# Patient Record
Sex: MALE | Race: WHITE | Employment: OTHER
[De-identification: names, ages, dates, MRNs, and addresses within clinical notes are randomized per-mention and may not be internally consistent; named-entity substitution may affect disease eponyms.]

---

## 2021-01-15 ENCOUNTER — TRANSCRIBE ORDERS (OUTPATIENT)
Dept: OTHER | Age: 76
End: 2021-01-15

## 2021-01-15 DIAGNOSIS — N40.1 BENIGN PROSTATIC HYPERPLASIA WITH LOWER URINARY TRACT SYMPTOMS: Primary | ICD-10-CM

## 2021-01-19 NOTE — TELEPHONE ENCOUNTER
MEDICAL RECORDS REQUEST   [facility]  Urology Clinic  [address] [place] [address]  PHONE: [phone]  Fax: [phone]        FUTURE VISIT INFORMATION                                                   Carlos Ricks, : 1945 scheduled for future visit at [facility]    APPOINTMENT INFORMATION:    Date: 2021 7AM    Provider:  BELLE Page    Reason for Visit/Diagnosis: BPH     REFERRAL INFORMATION:    Referring provider:  N/A    Specialty: N/A    [facility]    Clinic contact number:  N/A    RECORDS REQUESTED FOR VISIT                                                     NOTES  STATUS/DETAILS   OFFICE NOTE from referring provider  yes   OFFICE NOTE from other specialist  no   DISCHARGE SUMMARY from hospital  no   DISCHARGE REPORT from the ER  no   OPERATIVE REPORT  no   MEDICATION LIST  no     PRE-VISIT CHECKLIST      Record collection complete Yes - [facility] rec[facility] under Media tab in epic    Appointment appropriately scheduled           (right time/right provider) Yes   MyChart activation If no, please explain: in process   Questionnaire complete If no, please explain: in process      Completed by: Sherri Brock

## 2021-01-29 ENCOUNTER — PRE VISIT (OUTPATIENT)
Dept: UROLOGY | Facility: CLINIC | Age: 76
End: 2021-01-29

## 2021-01-29 NOTE — TELEPHONE ENCOUNTER
Chief Complaint : [facility] - [facility]    Hx/Sx: Urinary retention, BPH    Records/Orders: [facility] being obtained    Pt Contacted: N/a    At Rooming: Bobby Roland EMT

## 2021-02-12 ENCOUNTER — PRE VISIT (OUTPATIENT)
Dept: UROLOGY | Facility: CLINIC | Age: 76
End: 2021-02-12

## 2021-02-12 ENCOUNTER — VIRTUAL VISIT (OUTPATIENT)
Dept: UROLOGY | Facility: CLINIC | Age: 76
End: 2021-02-12
Payer: COMMERCIAL

## 2021-02-12 VITALS — BODY MASS INDEX: 25.2 KG/M2 | HEIGHT: 71 IN | WEIGHT: 180 LBS

## 2021-02-12 DIAGNOSIS — N40.1 BENIGN PROSTATIC HYPERPLASIA WITH URINARY RETENTION: Primary | ICD-10-CM

## 2021-02-12 DIAGNOSIS — R33.8 BENIGN PROSTATIC HYPERPLASIA WITH URINARY RETENTION: Primary | ICD-10-CM

## 2021-02-12 PROCEDURE — 99203 OFFICE O/P NEW LOW 30 MIN: CPT | Mod: 95 | Performed by: NURSE PRACTITIONER

## 2021-02-12 RX ORDER — ASPIRIN 81 MG/1
81 TABLET ORAL DAILY
COMMUNITY

## 2021-02-12 RX ORDER — TAMSULOSIN HYDROCHLORIDE 0.4 MG/1
0.4 CAPSULE ORAL EVERY EVENING
Status: ON HOLD | COMMUNITY
Start: 2020-07-22 | End: 2021-06-03

## 2021-02-12 ASSESSMENT — PAIN SCALES - GENERAL: PAINLEVEL: NO PAIN (0)

## 2021-02-12 ASSESSMENT — MIFFLIN-ST. JEOR: SCORE: 1583.6

## 2021-02-12 NOTE — LETTER
2/12/2021       RE: Carlos Ricks  [address]  [place] [address]     Dear Colleague,    Thank you for referring your patient, Carlos Ricks, to the [facility] UROLOGY CLINIC [place] at [facility]. Please see a copy of my visit note below.    Carlos is a 75 year old who is being evaluated via a billable telephone visit.     How would you like to obtain your AVS? Mail a copy  If the video visit is dropped, the invitation should be resent by: Send to e-mail at: [email or web address] - [email or web address]  Will anyone else be joining your video visit? No        Carlos Ricks complains of   Chief Complaint   Patient presents with     Consult For     BPH, not currently experiencing LUTS, CIC BID         Assessment/Plan:  75 year old male with a history of BPH with urinary retention and large bladder diverticula x2. Is interested in pursuing the HoLEP procedure, and we discussed the potential surgical risks today, including the following:   -Bleeding, potentially significant enough to require clot evacuation and blood transfusion  -Infection, for which we will plan to treat preoperatively based on targeted antibiotic therapy  -Damage to the bladder, urethra and penis including the risk of urethral stricture and bladder neck contracture  -Risk of incidentally discovered prostate cancer.  We discussed that this would not preclude him from further therapy though it could prolong recovery and potentially increase risk of complications associated with cancer treatment.   -Risk of retrograde ejaculation which would be expected to occur in the majority if not all men after HoLEP  -Risk of urinary incontinence.  We discussed that in the majority of men this is a transient process that is generally self limited to the first 6-12 weeks after surgery though could take longer to resolve depending on baseline bladder instability.  -Risk of postperative urinary retention though in published  series HoLEP has been found to be associated with high success rates of achieving spontaneous voiding even in men with underactive and atonic bladders.    Patient to follow up with Dr. Carbajal for a cystoscopy.      Olamide Page, CNP  Department of Urology      Subjective:     75 year old male with a history of BPH with urinary retention and large bladder diverticula x2 who [facility].     He is currently performing CIC twice daily, AM and PM, with no issue. Takes Flomax as well, which seems to help his symptoms; notices they worsen when he skips doses. He feels he is stable on his current regimen of Flomax and CIC but does wish to pursue surgery to avoid permanent catheter use.         Duration of telephone call: 22 minutes

## 2021-02-12 NOTE — PROGRESS NOTES
Carlos is a 75 year old who is being evaluated via a billable telephone visit.     How would you like to obtain your AVS? Mail a copy  If the video visit is dropped, the invitation should be resent by: Send to e-mail at: [email or web address]  Will anyone else be joining your video visit? No        Carlos Ricks complains of   Chief Complaint   Patient presents with     Consult For     BPH, not currently experiencing LUTS, CIC BID         Assessment/Plan:  75 year old male with a history of BPH with urinary retention and large bladder diverticula x2. Is interested in pursuing the HoLEP procedure, and we discussed the potential surgical risks today, including the following:   -Bleeding, potentially significant enough to require clot evacuation and blood transfusion  -Infection, for which we will plan to treat preoperatively based on targeted antibiotic therapy  -Damage to the bladder, urethra and penis including the risk of urethral stricture and bladder neck contracture  -Risk of incidentally discovered prostate cancer.  We discussed that this would not preclude him from further therapy though it could prolong recovery and potentially increase risk of complications associated with cancer treatment.   -Risk of retrograde ejaculation which would be expected to occur in the majority if not all men after HoLEP  -Risk of urinary incontinence.  We discussed that in the majority of men this is a transient process that is generally self limited to the first 6-12 weeks after surgery though could take longer to resolve depending on baseline bladder instability.  -Risk of postperative urinary retention though in published series HoLEP has been found to be associated with high success rates of achieving spontaneous voiding even in men with underactive and atonic bladders.    Patient to follow up with Dr. Carbajal for a cystoscopy.      Olamide Page, CNP  Department of Urology      Subjective:     75 year old male with a history  of BPH with urinary retention and large bladder diverticula x2 who [facility].     He is currently performing CIC twice daily, AM and PM, with no issue. Takes Flomax as well, which seems to help his symptoms; notices they worsen when he skips doses. He feels he is stable on his current regimen of Flomax and CIC but does wish to pursue surgery to avoid permanent catheter use.         Duration of telephone call: 22 minutes

## 2021-02-12 NOTE — NURSING NOTE
Chief Complaint   Patient presents with     Consult For     BPH, not currently experiencing LUTS, CIC BID   - Patt Louis,   EMT Clinic Support

## 2021-02-15 ENCOUNTER — TELEPHONE (OUTPATIENT)
Dept: UROLOGY | Facility: CLINIC | Age: 76
End: 2021-02-15

## 2021-03-13 ENCOUNTER — HEALTH MAINTENANCE LETTER (OUTPATIENT)
Age: 76
End: 2021-03-13

## 2021-04-08 ENCOUNTER — PRE VISIT (OUTPATIENT)
Dept: UROLOGY | Facility: CLINIC | Age: 76
End: 2021-04-08

## 2021-04-08 NOTE — TELEPHONE ENCOUNTER
Reason for visit: cysoscopy     Dx/Hx/Sx: BPH, CIC BID interested in HoLEP,     Records/imaging/labs/orders: availble    Pt called: NA    At Rooming: confirm MyChart AUA, urine sample, possible flow/PVR after cysto

## 2021-04-13 ENCOUNTER — OFFICE VISIT (OUTPATIENT)
Dept: UROLOGY | Facility: CLINIC | Age: 76
End: 2021-04-13
Payer: COMMERCIAL

## 2021-04-13 ENCOUNTER — TELEPHONE (OUTPATIENT)
Dept: UROLOGY | Facility: CLINIC | Age: 76
End: 2021-04-13

## 2021-04-13 VITALS
HEART RATE: 74 BPM | BODY MASS INDEX: 25.2 KG/M2 | WEIGHT: 180 LBS | DIASTOLIC BLOOD PRESSURE: 80 MMHG | HEIGHT: 71 IN | SYSTOLIC BLOOD PRESSURE: 120 MMHG

## 2021-04-13 DIAGNOSIS — R33.8 BENIGN PROSTATIC HYPERPLASIA WITH URINARY RETENTION: Primary | ICD-10-CM

## 2021-04-13 DIAGNOSIS — N40.1 BENIGN PROSTATIC HYPERPLASIA WITH URINARY RETENTION: Primary | ICD-10-CM

## 2021-04-13 PROCEDURE — 99207 PR NO CHARGE LOS: CPT | Performed by: UROLOGY

## 2021-04-13 PROCEDURE — 99207 PR NO CHARGE LOS: CPT

## 2021-04-13 RX ORDER — LIDOCAINE HYDROCHLORIDE 20 MG/ML
JELLY TOPICAL ONCE
Status: DISCONTINUED | OUTPATIENT
Start: 2021-04-13 | End: 2021-06-02

## 2021-04-13 ASSESSMENT — MIFFLIN-ST. JEOR: SCORE: 1583.6

## 2021-04-13 ASSESSMENT — PAIN SCALES - GENERAL: PAINLEVEL: NO PAIN (0)

## 2021-04-13 NOTE — NURSING NOTE
Pre Op Teaching Flowsheet       Pre and Post op Patient Education  Relevant Diagnosis:  Benign prostatic hyperplasia with urinary retention  Surgical procedure:  Holmium Laser Enucleation of the Prostate  Person Involved in teaching: Carlos Ricks      Patient demonstrates understanding of the following:  Date of surgery:  5/27/21  Location of surgery:  [facility]rd [facility]  History and Physical and any other testing necessary prior to surgery: Yes- Primary  Required time line for completion of History and Physical and any pre-op testing: Yes      Patient demonstrates understanding of the following:  Pre-op bowel prep:  None  Pre-op showering/scrub information with PCMX Soap: Yes  Blood thinner medications discussed and when to stop (if applicable):  N/A  Diabetic Management teaching:  N/A      Infection Prevention:   Patient demonstrates understanding of the following:  Surgical procedure site care taught: at time of discharge  Signs and symptoms of infection taught:  Yes      Post-op follow-up:  Discussed how to contact the hospital, nurse, and clinic scheduling staff if necessary.    Instructional materials used/given/mailed:  [facility] Booklet, Soap..    Surgical instructions packet given to patient in office:  Yes.    PCP:  Suyapa Gerber

## 2021-04-13 NOTE — NURSING NOTE
"Chief Complaint   Patient presents with     Cystoscopy       Blood pressure 120/80, pulse 74, height 1.803 m (5' 11\"), weight 81.6 kg (180 lb). Body mass index is 25.1 kg/m .    There is no problem list on file for this patient.      Allergies   Allergen Reactions     Seasonal Allergies Other (See Comments)     Other reaction(s): Rhinitis, Nasal discharge, Itching of eye       Current Outpatient Medications   Medication Sig Dispense Refill     aspirin 81 MG EC tablet Take 81 mg by mouth daily       Multiple Vitamin (MULTI-VITAMIN DAILY PO)        omeprazole (PRILOSEC) 20 MG DR capsule TAKE TWO CAPSULES BY MOUTH EVERY DAY ON AN EMPTY STOMACH, AT LEAST 30 MINUTES PRIOR TO A MEAL FOR GERD       tamsulosin (FLOMAX) 0.4 MG capsule          Social History     Tobacco Use     Smoking status: Former Smoker     Quit date: 1990     Years since quittin.1     Smokeless tobacco: Never Used   Substance Use Topics     Alcohol use: None     Drug use: None       Invasive Procedure Safety Checklist:    Procedure: Cystoscopy    Action: Complete sections and checkboxes as appropriate.    Pre-procedure:  1. Patient ID Verified with 2 identifiers (Kelly and  or MRN) : YES    2. Procedure and site verified with patient/designee (when able) : YES    3. Accurate consent documentation in medical record : YES    4. H&P (or appropriate assessment) documented in medical record : N/A  H&P must be up to 30 days prior to procedure an updated within 24 hours of                 Procedure as applicable.     5. Relevant diagnostic and radiology test results appropriately labeled and displayed as applicable : YES    6. Blood products, implants, devices, and/or special equipment available for the procedure as applicable : YES    7. Procedure site(s) marked with provider initials [Exclusions: none] : NO    8. Marking not required. Reason : Yes  Procedure does not require site marking    Time Out:     Time-Out performed immediately prior to " starting procedure, including verbal and active participation of all team members addressing: YES    1. Correct patient identity.  2. Confirmed that the correct side and site are marked.  3. An accurate procedure to be done.  4. Agreement on the procedure to be done.  5. Correct patient position.  6. Relevant images and results are properly labeled and appropriately displayed.  7. The need to administer antibiotics or fluids for irrigation purposes during the procedure as applicable.  8. Safety precautions based on patient history or medication use.    During Procedure: Verification of correct person, site, and procedure occurs any time the responsibility for care of the patient is transferred to another member of the care team.    The following medication was given:     MEDICATION:  Lidocaine without epinephrine 2% jelly  ROUTE: urethral   SITE: urethral   DOSE: 10 mL  LOT #: [id number]  : [employer]  EXPIRATION DATE: 10/22  NDC#: [address]-8   Was there drug waste? No    Prior to med admin, verified patient identity using patient's name and date of birth.  Due to med administration, patient instructed to remain in clinic for 15 minutes  afterwards, and to report any adverse reaction to me immediately.    Drug Amount Wasted:  None.  Vial/Syringe: Syringe      Vnekat Price  4/13/2021  2:11 PM

## 2021-04-13 NOTE — LETTER
4/13/2021       RE: Carlos Ricks  [address]  [place] [address]     Dear Colleague,    Thank you for referring your patient, Carlos Ricks, to the [facility] UROLOGY CLINIC [place] at [facility]. Please see a copy of my visit note below.    CYSTOSCOPY PROCEDURE NOTE    Reason for cystoscopy: Urinary retention    Brief History: 75-year-old gentleman with a history of urinary retention and large bladder diverticulum.  He has been previously counseled regarding treatment options and has been suggested to consider holmium laser enucleation of the prostate.  He has been on CIC though this is significantly bothersome to him.  He is emptying though incompletely especially since starting catheterization.    CYSTOSCOPY  After obtaining informed consent, the patient was prepped and draped in the standard sterile fashion.  The 15 Maldivian flexible cystoscope was inserted through the urethral meatus.      The anterior urethra was:  normal without stricture.    The external sphincter was  appropriately coapted.   The prostatic urethra demonstrated trilobar hypertrophy.    The bladder neck was  occlusive.    The bladder was notable for abundant debris.  We evacuated several hundred cc of cloudy appearing urine.  The remaining anterior of the bladder was surveilled to the best of our ability.  Visualization was somewhat poor.  We did eventually identify a very large right-sided bladder diverticulum.The ureteral orifices  were identified on each side in orthotopic position with efflux of clear urine.   There were severe trabeculations.    On retroflexion there was the usual bladder neck hyperemia.    There  intravesical protrusion of the prostate.      The patient tolerated the procedure well without complication.        Assessment/Plan:  After discussion of medical and surgical treatments for BPH including alpha blockers, anticholinergics, transurethral resection,  laser ablation, enucleation and simple prostatectomy, Mr. Webb decided to proceed with Holmium Laser Enucleation of the Prostate (HoLEP).    We discussed the associated risks of this procedure included but not limited to the following:  -Bleeding, potentially significant enough to require clot evacuation and blood transfusion  -Infection, for which we will plan to treat preoperatively based on targeted antibiotic therapy  -Damage to the bladder, urethra and penis including the risk of urethral stricture and bladder neck contracture  -Risk of incidentally discovered prostate cancer.  We discussed that this would not preclude him from further therapy though it could prolong recovery and potentially increase risk of complications associated with cancer treatment.  Further preoperative workup to assess for prostate cancer prior to surgery was offered but patient deferred.  -Risk of retrograde ejaculation which would be expected to occur in the majority if not all men after HoLEP  -Risk of urinary incontinence.  We discussed that in the majority of men this is a transient process that is generally self limited to the first 6-12 weeks after surgery though could take longer to resolve depending on baseline bladder instability.  -Risk of postperative urinary retention though in published series HoLEP has been found to be associated with high success rates of achieving spontaneous voiding even in men with underactive and atonic bladders.  -We will proceed with preoperative clearance with preference to minimize all anticoagulation as deemed acceptable by his primary care provider.   -In addition to the above we also discussed the nature of bladder diverticulum and that especially with large diverticuli there are times where a second surgery to remove the diverticulum is necessary.  We did review that in prior published series urinary retention even with a bladder diverticulum is typically able to be managed after  enucleation.        I, Florentin Carbajal saw and evaluated this patient and agree with the plan as stated above.  I personally performed all listed procedures.

## 2021-04-13 NOTE — TELEPHONE ENCOUNTER
Patient is scheduled for surgery with Dr. Carbajal    Spoke with: Tia RN - RN scheduled with patient in clinic    Date of Surgery: 5/27/2021    Location: [place]    Informed patient they will need an adult  yes    Pre-op with surgeon (if applicable): n/a    H&P: Scheduled with pcp    Pre-procedure COVID-19 Test: [facility] on 5/24/2021    Additional imaging/appointments: n/a    Surgery packet: Given in clinic     Additional comments: n/a

## 2021-04-26 NOTE — PROGRESS NOTES
CYSTOSCOPY PROCEDURE NOTE    Reason for cystoscopy: Urinary retention    Brief History: 75-year-old gentleman with a history of urinary retention and large bladder diverticulum.  He has been previously counseled regarding treatment options and has been suggested to consider holmium laser enucleation of the prostate.  He has been on CIC though this is significantly bothersome to him.  He is emptying though incompletely especially since starting catheterization.    CYSTOSCOPY  After obtaining informed consent, the patient was prepped and draped in the standard sterile fashion.  The 15 Occitan flexible cystoscope was inserted through the urethral meatus.      The anterior urethra was:  normal without stricture.    The external sphincter was  appropriately coapted.   The prostatic urethra demonstrated trilobar hypertrophy.    The bladder neck was  occlusive.    The bladder was notable for abundant debris.  We evacuated several hundred cc of cloudy appearing urine.  The remaining anterior of the bladder was surveilled to the best of our ability.  Visualization was somewhat poor.  We did eventually identify a very large right-sided bladder diverticulum.The ureteral orifices  were identified on each side in orthotopic position with efflux of clear urine.   There were severe trabeculations.    On retroflexion there was the usual bladder neck hyperemia.    There  intravesical protrusion of the prostate.      The patient tolerated the procedure well without complication.        Assessment/Plan:  After discussion of medical and surgical treatments for BPH including alpha blockers, anticholinergics, transurethral resection, laser ablation, enucleation and simple prostatectomy, Mr. Webb decided to proceed with Holmium Laser Enucleation of the Prostate (HoLEP).    We discussed the associated risks of this procedure included but not limited to the following:  -Bleeding, potentially significant enough to require clot evacuation  and blood transfusion  -Infection, for which we will plan to treat preoperatively based on targeted antibiotic therapy  -Damage to the bladder, urethra and penis including the risk of urethral stricture and bladder neck contracture  -Risk of incidentally discovered prostate cancer.  We discussed that this would not preclude him from further therapy though it could prolong recovery and potentially increase risk of complications associated with cancer treatment.  Further preoperative workup to assess for prostate cancer prior to surgery was offered but patient deferred.  -Risk of retrograde ejaculation which would be expected to occur in the majority if not all men after HoLEP  -Risk of urinary incontinence.  We discussed that in the majority of men this is a transient process that is generally self limited to the first 6-12 weeks after surgery though could take longer to resolve depending on baseline bladder instability.  -Risk of postperative urinary retention though in published series HoLEP has been found to be associated with high success rates of achieving spontaneous voiding even in men with underactive and atonic bladders.  -We will proceed with preoperative clearance with preference to minimize all anticoagulation as deemed acceptable by his primary care provider.   -In addition to the above we also discussed the nature of bladder diverticulum and that especially with large diverticuli there are times where a second surgery to remove the diverticulum is necessary.  We did review that in prior published series urinary retention even with a bladder diverticulum is typically able to be managed after enucleation.        IFlorentin saw and evaluated this patient and agree with the plan as stated above.  I personally performed all listed procedures.

## 2021-04-27 ENCOUNTER — TRANSFERRED RECORDS (OUTPATIENT)
Dept: HEALTH INFORMATION MANAGEMENT | Facility: CLINIC | Age: 76
End: 2021-04-27

## 2021-05-10 NOTE — TELEPHONE ENCOUNTER
Called and left a voicemail for patient informing him the Dr. Carbajal is no longer operating on 5/27/2021 and that we need to reschedule is surgery. Offered 6/2/2021 as new surgery date. Gave [phone] as call back number.

## 2021-05-10 NOTE — TELEPHONE ENCOUNTER
Returned patient's voicemail about 6/2/2021 working for the reschedule date for his surgery. Left surgery information on the voicemail.    Patient is scheduled for surgery with Dr. Ravne Esquivel voicemail for Carlos    Date of Surgery: 6/2/2021    Location: [place]    Informed patient they will need an adult  yes    Pre-op with surgeon (if applicable): n/a    H&P: Scheduled with pcp    Pre-procedure COVID-19 Test: [facility] on 5/29/2021    Additional imaging/appointments: n/a    Surgery packet: Mailed via [employer] on 5/10/21     Additional comments: n/a

## 2021-05-12 ENCOUNTER — PATIENT OUTREACH (OUTPATIENT)
Dept: UROLOGY | Facility: CLINIC | Age: 76
End: 2021-05-12

## 2021-05-12 DIAGNOSIS — R30.0 DYSURIA: Primary | ICD-10-CM

## 2021-05-13 DIAGNOSIS — R30.0 DYSURIA: ICD-10-CM

## 2021-05-13 LAB

## 2021-05-13 PROCEDURE — 87088 URINE BACTERIA CULTURE: CPT | Performed by: UROLOGY

## 2021-05-13 PROCEDURE — 87086 URINE CULTURE/COLONY COUNT: CPT | Performed by: UROLOGY

## 2021-05-13 PROCEDURE — 87186 SC STD MICRODIL/AGAR DIL: CPT | Performed by: UROLOGY

## 2021-05-13 PROCEDURE — 81001 URINALYSIS AUTO W/SCOPE: CPT | Performed by: UROLOGY

## 2021-05-16 LAB
BACTERIA SPEC CULT: ABNORMAL
BACTERIA SPEC CULT: ABNORMAL
Lab: ABNORMAL
SPECIMEN SOURCE: ABNORMAL

## 2021-05-17 ENCOUNTER — PATIENT OUTREACH (OUTPATIENT)
Dept: UROLOGY | Facility: CLINIC | Age: 76
End: 2021-05-17

## 2021-05-17 DIAGNOSIS — R30.0 DYSURIA: Primary | ICD-10-CM

## 2021-05-17 DIAGNOSIS — Z11.59 ENCOUNTER FOR SCREENING FOR OTHER VIRAL DISEASES: ICD-10-CM

## 2021-05-17 RX ORDER — NITROFURANTOIN 25; 75 MG/1; MG/1
100 CAPSULE ORAL 2 TIMES DAILY
Qty: 14 CAPSULE | Refills: 0 | Status: SHIPPED | OUTPATIENT
Start: 2021-05-17 | End: 2021-05-24

## 2021-05-18 ENCOUNTER — PATIENT OUTREACH (OUTPATIENT)
Dept: UROLOGY | Facility: CLINIC | Age: 76
End: 2021-05-18

## 2021-05-18 DIAGNOSIS — R30.0 DYSURIA: Primary | ICD-10-CM

## 2021-05-18 RX ORDER — NITROFURANTOIN 25; 75 MG/1; MG/1
100 CAPSULE ORAL 2 TIMES DAILY
Qty: 14 CAPSULE | Refills: 0 | Status: CANCELLED | OUTPATIENT
Start: 2021-05-18 | End: 2021-05-25

## 2021-05-18 RX ORDER — NITROFURANTOIN 25; 75 MG/1; MG/1
100 CAPSULE ORAL 2 TIMES DAILY
Qty: 14 CAPSULE | Refills: 0 | Status: ON HOLD | OUTPATIENT
Start: 2021-05-26 | End: 2021-06-03

## 2021-05-29 DIAGNOSIS — Z11.59 ENCOUNTER FOR SCREENING FOR OTHER VIRAL DISEASES: ICD-10-CM

## 2021-05-29 LAB
LABORATORY COMMENT REPORT: NORMAL
SARS-COV-2 RNA RESP QL NAA+PROBE: NEGATIVE
SARS-COV-2 RNA RESP QL NAA+PROBE: NORMAL
SPECIMEN SOURCE: NORMAL
SPECIMEN SOURCE: NORMAL

## 2021-05-29 PROCEDURE — U0005 INFEC AGEN DETEC AMPLI PROBE: HCPCS | Performed by: UROLOGY

## 2021-05-29 PROCEDURE — U0003 INFECTIOUS AGENT DETECTION BY NUCLEIC ACID (DNA OR RNA); SEVERE ACUTE RESPIRATORY SYNDROME CORONAVIRUS 2 (SARS-COV-2) (CORONAVIRUS DISEASE [COVID-19]), AMPLIFIED PROBE TECHNIQUE, MAKING USE OF HIGH THROUGHPUT TECHNOLOGIES AS DESCRIBED BY CMS-2020-01-R: HCPCS | Performed by: UROLOGY

## 2021-06-01 ENCOUNTER — ANESTHESIA EVENT (OUTPATIENT)
Dept: SURGERY | Facility: CLINIC | Age: 76
End: 2021-06-01
Payer: COMMERCIAL

## 2021-06-01 ENCOUNTER — PATIENT OUTREACH (OUTPATIENT)
Dept: UROLOGY | Facility: CLINIC | Age: 76
End: 2021-06-01

## 2021-06-01 RX ORDER — GLIPIZIDE 5 MG/1
5 TABLET ORAL EVERY MORNING
COMMUNITY

## 2021-06-01 NOTE — TELEPHONE ENCOUNTER
I called patient to confirm that Dr. Carbajal will update his pre-op for surgery tomorrow. Patient was relieved. Rajwinder Yates RN

## 2021-06-02 ENCOUNTER — ANESTHESIA (OUTPATIENT)
Dept: SURGERY | Facility: CLINIC | Age: 76
End: 2021-06-02
Payer: COMMERCIAL

## 2021-06-02 ENCOUNTER — HOSPITAL ENCOUNTER (OUTPATIENT)
Facility: CLINIC | Age: 76
Discharge: HOME | End: 2021-06-03
Attending: UROLOGY | Admitting: UROLOGY
Payer: COMMERCIAL

## 2021-06-02 DIAGNOSIS — R30.0 DYSURIA: ICD-10-CM

## 2021-06-02 DIAGNOSIS — N40.1 BENIGN PROSTATIC HYPERPLASIA WITH URINARY RETENTION: ICD-10-CM

## 2021-06-02 DIAGNOSIS — R33.8 BENIGN PROSTATIC HYPERPLASIA WITH URINARY RETENTION: ICD-10-CM

## 2021-06-02 LAB
ABO + RH BLD: NORMAL
ABO + RH BLD: NORMAL
BLD GP AB SCN SERPL QL: NORMAL
BLOOD BANK CMNT PATIENT-IMP: NORMAL
GLUCOSE BLDC GLUCOMTR-MCNC: 103 MG/DL (ref 70–99)
GLUCOSE BLDC GLUCOMTR-MCNC: 112 MG/DL (ref 70–99)
SPECIMEN EXP DATE BLD: NORMAL

## 2021-06-02 PROCEDURE — 88305 TISSUE EXAM BY PATHOLOGIST: CPT | Mod: TC | Performed by: UROLOGY

## 2021-06-02 PROCEDURE — 250N000011 HC RX IP 250 OP 636: Performed by: STUDENT IN AN ORGANIZED HEALTH CARE EDUCATION/TRAINING PROGRAM

## 2021-06-02 PROCEDURE — C1758 CATHETER, URETERAL: HCPCS | Performed by: UROLOGY

## 2021-06-02 PROCEDURE — 370N000017 HC ANESTHESIA TECHNICAL FEE, PER MIN: Performed by: UROLOGY

## 2021-06-02 PROCEDURE — 250N000011 HC RX IP 250 OP 636: Performed by: NURSE ANESTHETIST, CERTIFIED REGISTERED

## 2021-06-02 PROCEDURE — 710N000010 HC RECOVERY PHASE 1, LEVEL 2, PER MIN: Performed by: UROLOGY

## 2021-06-02 PROCEDURE — 250N000011 HC RX IP 250 OP 636: Performed by: UROLOGY

## 2021-06-02 PROCEDURE — 258N000003 HC RX IP 258 OP 636: Performed by: NURSE ANESTHETIST, CERTIFIED REGISTERED

## 2021-06-02 PROCEDURE — 999N000141 HC STATISTIC PRE-PROCEDURE NURSING ASSESSMENT: Performed by: UROLOGY

## 2021-06-02 PROCEDURE — 88305 TISSUE EXAM BY PATHOLOGIST: CPT | Mod: 26 | Performed by: PATHOLOGY

## 2021-06-02 PROCEDURE — 36415 COLL VENOUS BLD VENIPUNCTURE: CPT | Performed by: UROLOGY

## 2021-06-02 PROCEDURE — 360N000077 HC SURGERY LEVEL 4, PER MIN: Performed by: UROLOGY

## 2021-06-02 PROCEDURE — 86900 BLOOD TYPING SEROLOGIC ABO: CPT | Performed by: UROLOGY

## 2021-06-02 PROCEDURE — 250N000013 HC RX MED GY IP 250 OP 250 PS 637: Performed by: STUDENT IN AN ORGANIZED HEALTH CARE EDUCATION/TRAINING PROGRAM

## 2021-06-02 PROCEDURE — 272N000001 HC OR GENERAL SUPPLY STERILE: Performed by: UROLOGY

## 2021-06-02 PROCEDURE — 258N000003 HC RX IP 258 OP 636

## 2021-06-02 PROCEDURE — 86901 BLOOD TYPING SEROLOGIC RH(D): CPT | Performed by: UROLOGY

## 2021-06-02 PROCEDURE — 250N000009 HC RX 250: Performed by: NURSE ANESTHETIST, CERTIFIED REGISTERED

## 2021-06-02 PROCEDURE — 82962 GLUCOSE BLOOD TEST: CPT

## 2021-06-02 PROCEDURE — 258N000001 HC RX 258: Performed by: STUDENT IN AN ORGANIZED HEALTH CARE EDUCATION/TRAINING PROGRAM

## 2021-06-02 PROCEDURE — 258N000003 HC RX IP 258 OP 636: Performed by: UROLOGY

## 2021-06-02 PROCEDURE — 250N000025 HC SEVOFLURANE, PER MIN: Performed by: UROLOGY

## 2021-06-02 PROCEDURE — 86850 RBC ANTIBODY SCREEN: CPT | Performed by: UROLOGY

## 2021-06-02 RX ORDER — NALOXONE HYDROCHLORIDE 0.4 MG/ML
0.4 INJECTION, SOLUTION INTRAMUSCULAR; INTRAVENOUS; SUBCUTANEOUS
Status: DISCONTINUED | OUTPATIENT
Start: 2021-06-02 | End: 2021-06-03 | Stop reason: HOSPADM

## 2021-06-02 RX ORDER — ONDANSETRON 4 MG/1
4 TABLET, ORALLY DISINTEGRATING ORAL EVERY 30 MIN PRN
Status: DISCONTINUED | OUTPATIENT
Start: 2021-06-02 | End: 2021-06-02 | Stop reason: HOSPADM

## 2021-06-02 RX ORDER — FENTANYL CITRATE 50 UG/ML
25-50 INJECTION, SOLUTION INTRAMUSCULAR; INTRAVENOUS
Status: DISCONTINUED | OUTPATIENT
Start: 2021-06-02 | End: 2021-06-02 | Stop reason: CLARIF

## 2021-06-02 RX ORDER — SODIUM CHLORIDE, SODIUM LACTATE, POTASSIUM CHLORIDE, CALCIUM CHLORIDE 600; 310; 30; 20 MG/100ML; MG/100ML; MG/100ML; MG/100ML
INJECTION, SOLUTION INTRAVENOUS CONTINUOUS
Status: CANCELLED | OUTPATIENT
Start: 2021-06-02

## 2021-06-02 RX ORDER — PROPOFOL 10 MG/ML
INJECTION, EMULSION INTRAVENOUS PRN
Status: DISCONTINUED | OUTPATIENT
Start: 2021-06-02 | End: 2021-06-02

## 2021-06-02 RX ORDER — SODIUM CHLORIDE, SODIUM LACTATE, POTASSIUM CHLORIDE, CALCIUM CHLORIDE 600; 310; 30; 20 MG/100ML; MG/100ML; MG/100ML; MG/100ML
INJECTION, SOLUTION INTRAVENOUS CONTINUOUS PRN
Status: DISCONTINUED | OUTPATIENT
Start: 2021-06-02 | End: 2021-06-02

## 2021-06-02 RX ORDER — EPHEDRINE SULFATE 50 MG/ML
INJECTION, SOLUTION INTRAMUSCULAR; INTRAVENOUS; SUBCUTANEOUS PRN
Status: DISCONTINUED | OUTPATIENT
Start: 2021-06-02 | End: 2021-06-02

## 2021-06-02 RX ORDER — SODIUM CHLORIDE 9 MG/ML
INJECTION, SOLUTION INTRAVENOUS
Status: COMPLETED
Start: 2021-06-02 | End: 2021-06-02

## 2021-06-02 RX ORDER — LIDOCAINE HYDROCHLORIDE 20 MG/ML
INJECTION, SOLUTION INFILTRATION; PERINEURAL PRN
Status: DISCONTINUED | OUTPATIENT
Start: 2021-06-02 | End: 2021-06-02

## 2021-06-02 RX ORDER — LIDOCAINE 40 MG/G
CREAM TOPICAL
Status: DISCONTINUED | OUTPATIENT
Start: 2021-06-02 | End: 2021-06-03 | Stop reason: HOSPADM

## 2021-06-02 RX ORDER — OXYCODONE HYDROCHLORIDE 5 MG/1
5-10 TABLET ORAL
Status: DISCONTINUED | OUTPATIENT
Start: 2021-06-02 | End: 2021-06-03 | Stop reason: HOSPADM

## 2021-06-02 RX ORDER — NALOXONE HYDROCHLORIDE 0.4 MG/ML
0.4 INJECTION, SOLUTION INTRAMUSCULAR; INTRAVENOUS; SUBCUTANEOUS
Status: DISCONTINUED | OUTPATIENT
Start: 2021-06-02 | End: 2021-06-02 | Stop reason: HOSPADM

## 2021-06-02 RX ORDER — HYDROMORPHONE HYDROCHLORIDE 1 MG/ML
.3-.5 INJECTION, SOLUTION INTRAMUSCULAR; INTRAVENOUS; SUBCUTANEOUS EVERY 10 MIN PRN
Status: DISCONTINUED | OUTPATIENT
Start: 2021-06-02 | End: 2021-06-02 | Stop reason: HOSPADM

## 2021-06-02 RX ORDER — ONDANSETRON 2 MG/ML
INJECTION INTRAMUSCULAR; INTRAVENOUS PRN
Status: DISCONTINUED | OUTPATIENT
Start: 2021-06-02 | End: 2021-06-02

## 2021-06-02 RX ORDER — DEXAMETHASONE SODIUM PHOSPHATE 4 MG/ML
INJECTION, SOLUTION INTRA-ARTICULAR; INTRALESIONAL; INTRAMUSCULAR; INTRAVENOUS; SOFT TISSUE PRN
Status: DISCONTINUED | OUTPATIENT
Start: 2021-06-02 | End: 2021-06-02

## 2021-06-02 RX ORDER — NALOXONE HYDROCHLORIDE 0.4 MG/ML
0.2 INJECTION, SOLUTION INTRAMUSCULAR; INTRAVENOUS; SUBCUTANEOUS
Status: DISCONTINUED | OUTPATIENT
Start: 2021-06-02 | End: 2021-06-02 | Stop reason: HOSPADM

## 2021-06-02 RX ORDER — FENTANYL CITRATE 50 UG/ML
INJECTION, SOLUTION INTRAMUSCULAR; INTRAVENOUS PRN
Status: DISCONTINUED | OUTPATIENT
Start: 2021-06-02 | End: 2021-06-02

## 2021-06-02 RX ORDER — HYDRALAZINE HYDROCHLORIDE 20 MG/ML
2.5-5 INJECTION INTRAMUSCULAR; INTRAVENOUS EVERY 10 MIN PRN
Status: DISCONTINUED | OUTPATIENT
Start: 2021-06-02 | End: 2021-06-02 | Stop reason: CLARIF

## 2021-06-02 RX ORDER — ONDANSETRON 2 MG/ML
4 INJECTION INTRAMUSCULAR; INTRAVENOUS EVERY 6 HOURS PRN
Status: DISCONTINUED | OUTPATIENT
Start: 2021-06-02 | End: 2021-06-03 | Stop reason: HOSPADM

## 2021-06-02 RX ORDER — ONDANSETRON 4 MG/1
4 TABLET, ORALLY DISINTEGRATING ORAL EVERY 6 HOURS PRN
Status: DISCONTINUED | OUTPATIENT
Start: 2021-06-02 | End: 2021-06-03 | Stop reason: HOSPADM

## 2021-06-02 RX ORDER — ONDANSETRON 2 MG/ML
4 INJECTION INTRAMUSCULAR; INTRAVENOUS EVERY 30 MIN PRN
Status: DISCONTINUED | OUTPATIENT
Start: 2021-06-02 | End: 2021-06-02 | Stop reason: HOSPADM

## 2021-06-02 RX ORDER — METOPROLOL TARTRATE 1 MG/ML
1-2 INJECTION, SOLUTION INTRAVENOUS EVERY 5 MIN PRN
Status: DISCONTINUED | OUTPATIENT
Start: 2021-06-02 | End: 2021-06-02 | Stop reason: CLARIF

## 2021-06-02 RX ORDER — SODIUM CHLORIDE, SODIUM LACTATE, POTASSIUM CHLORIDE, CALCIUM CHLORIDE 600; 310; 30; 20 MG/100ML; MG/100ML; MG/100ML; MG/100ML
INJECTION, SOLUTION INTRAVENOUS CONTINUOUS
Status: DISCONTINUED | OUTPATIENT
Start: 2021-06-02 | End: 2021-06-02 | Stop reason: HOSPADM

## 2021-06-02 RX ORDER — SODIUM CHLORIDE 9 MG/ML
INJECTION, SOLUTION INTRAVENOUS CONTINUOUS
Status: DISCONTINUED | OUTPATIENT
Start: 2021-06-02 | End: 2021-06-03 | Stop reason: HOSPADM

## 2021-06-02 RX ORDER — ACETAMINOPHEN 325 MG/1
650 TABLET ORAL EVERY 6 HOURS
Status: DISCONTINUED | OUTPATIENT
Start: 2021-06-02 | End: 2021-06-03 | Stop reason: HOSPADM

## 2021-06-02 RX ORDER — METFORMIN HCL 500 MG
2000 TABLET, EXTENDED RELEASE 24 HR ORAL AT BEDTIME
COMMUNITY

## 2021-06-02 RX ORDER — NALOXONE HYDROCHLORIDE 0.4 MG/ML
0.2 INJECTION, SOLUTION INTRAMUSCULAR; INTRAVENOUS; SUBCUTANEOUS
Status: DISCONTINUED | OUTPATIENT
Start: 2021-06-02 | End: 2021-06-03 | Stop reason: HOSPADM

## 2021-06-02 RX ORDER — AMPICILLIN 2 G/1
2 INJECTION, POWDER, FOR SOLUTION INTRAVENOUS EVERY 6 HOURS
Status: DISCONTINUED | OUTPATIENT
Start: 2021-06-02 | End: 2021-06-03 | Stop reason: HOSPADM

## 2021-06-02 RX ADMIN — SODIUM CHLORIDE: 9 INJECTION, SOLUTION INTRAVENOUS at 14:27

## 2021-06-02 RX ADMIN — PHENYLEPHRINE HYDROCHLORIDE 100 MCG: 10 INJECTION INTRAVENOUS at 08:20

## 2021-06-02 RX ADMIN — PHENYLEPHRINE HYDROCHLORIDE 0.4 MCG/KG/MIN: 10 INJECTION INTRAVENOUS at 08:22

## 2021-06-02 RX ADMIN — ONDANSETRON 4 MG: 2 INJECTION INTRAMUSCULAR; INTRAVENOUS at 09:35

## 2021-06-02 RX ADMIN — ACETAMINOPHEN 650 MG: 325 TABLET, FILM COATED ORAL at 22:22

## 2021-06-02 RX ADMIN — SODIUM CHLORIDE, POTASSIUM CHLORIDE, SODIUM LACTATE AND CALCIUM CHLORIDE: 600; 310; 30; 20 INJECTION, SOLUTION INTRAVENOUS at 09:48

## 2021-06-02 RX ADMIN — OXYCODONE HYDROCHLORIDE 5 MG: 5 TABLET ORAL at 10:59

## 2021-06-02 RX ADMIN — LIDOCAINE HYDROCHLORIDE 80 MG: 20 INJECTION, SOLUTION INFILTRATION; PERINEURAL at 07:36

## 2021-06-02 RX ADMIN — AMPICILLIN 2 G: 2 INJECTION, POWDER, FOR SOLUTION INTRAVENOUS at 14:24

## 2021-06-02 RX ADMIN — AMPICILLIN 2 G: 2 INJECTION, POWDER, FOR SOLUTION INTRAVENOUS at 20:17

## 2021-06-02 RX ADMIN — SODIUM CHLORIDE: 900 IRRIGANT IRRIGATION at 11:40

## 2021-06-02 RX ADMIN — PHENYLEPHRINE HYDROCHLORIDE: 10 INJECTION INTRAVENOUS at 08:58

## 2021-06-02 RX ADMIN — GENTAMICIN SULFATE 300 MG: 40 INJECTION, SOLUTION INTRAMUSCULAR; INTRAVENOUS at 08:03

## 2021-06-02 RX ADMIN — PROPOFOL 20 MG: 10 INJECTION, EMULSION INTRAVENOUS at 09:58

## 2021-06-02 RX ADMIN — Medication 5 MG: at 08:26

## 2021-06-02 RX ADMIN — SODIUM CHLORIDE, POTASSIUM CHLORIDE, SODIUM LACTATE AND CALCIUM CHLORIDE: 600; 310; 30; 20 INJECTION, SOLUTION INTRAVENOUS at 07:30

## 2021-06-02 RX ADMIN — PROPOFOL 150 MG: 10 INJECTION, EMULSION INTRAVENOUS at 07:36

## 2021-06-02 RX ADMIN — ROCURONIUM BROMIDE 50 MG: 10 INJECTION INTRAVENOUS at 07:36

## 2021-06-02 RX ADMIN — AMPICILLIN 2 G: 2 INJECTION, POWDER, FOR SOLUTION INTRAVENOUS at 08:00

## 2021-06-02 RX ADMIN — ACETAMINOPHEN 650 MG: 325 TABLET, FILM COATED ORAL at 10:59

## 2021-06-02 RX ADMIN — DEXAMETHASONE SODIUM PHOSPHATE 4 MG: 4 INJECTION, SOLUTION INTRAMUSCULAR; INTRAVENOUS at 08:10

## 2021-06-02 RX ADMIN — PHENYLEPHRINE HYDROCHLORIDE 100 MCG: 10 INJECTION INTRAVENOUS at 07:40

## 2021-06-02 RX ADMIN — PROPOFOL 30 MG: 10 INJECTION, EMULSION INTRAVENOUS at 09:41

## 2021-06-02 RX ADMIN — FENTANYL CITRATE 50 MCG: 50 INJECTION, SOLUTION INTRAMUSCULAR; INTRAVENOUS at 08:06

## 2021-06-02 RX ADMIN — ACETAMINOPHEN 650 MG: 325 TABLET, FILM COATED ORAL at 16:26

## 2021-06-02 RX ADMIN — SUGAMMADEX 200 MG: 100 INJECTION, SOLUTION INTRAVENOUS at 10:03

## 2021-06-02 RX ADMIN — FENTANYL CITRATE 50 MCG: 50 INJECTION, SOLUTION INTRAMUSCULAR; INTRAVENOUS at 07:36

## 2021-06-02 RX ADMIN — FENTANYL CITRATE 50 MCG: 50 INJECTION, SOLUTION INTRAMUSCULAR; INTRAVENOUS at 09:11

## 2021-06-02 RX ADMIN — PHENYLEPHRINE HYDROCHLORIDE 100 MCG: 10 INJECTION INTRAVENOUS at 08:17

## 2021-06-02 ASSESSMENT — ACTIVITIES OF DAILY LIVING (ADL)
TOILETING_ISSUES: NO
FALL_HISTORY_WITHIN_LAST_SIX_MONTHS: NO
TOILETING_ISSUES: NO

## 2021-06-02 ASSESSMENT — MIFFLIN-ST. JEOR: SCORE: 1542.13

## 2021-06-02 ASSESSMENT — ENCOUNTER SYMPTOMS: SEIZURES: 0

## 2021-06-02 ASSESSMENT — COPD QUESTIONNAIRES: COPD: 0

## 2021-06-02 NOTE — ANESTHESIA PREPROCEDURE EVALUATION
Anesthesia Pre-Procedure Evaluation    Patient: Carlos Ricks   MRN: [id number] : 1945        Preoperative Diagnosis: Benign prostatic hyperplasia with urinary retention [N40.1, R33.8]   Procedure : Procedure(s):  Holmium Laser Enucleation of the Prostate     Past Medical History:   Diagnosis Date     BPH (benign prostatic hyperplasia)      Diabetes (H)      Gastroesophageal reflux disease      Sleep apnea     uses CPAP      History reviewed. No pertinent surgical history.   Allergies   Allergen Reactions     Seasonal Allergies Other (See Comments)     Other reaction(s): Rhinitis, Nasal discharge, Itching of eye      Social History     Tobacco Use     Smoking status: Former Smoker     Quit date: 1990     Years since quittin.3     Smokeless tobacco: Never Used   Substance Use Topics     Alcohol use: Not Currently      Wt Readings from Last 1 Encounters:   21 77.5 kg (170 lb 13.7 oz)        Anesthesia Evaluation            ROS/MED HX  ENT/Pulmonary:     (+) sleep apnea, uses CPAP,  (-) asthma, COPD and recent URI   Neurologic:    (-) no seizures, no CVA and no TIA   Cardiovascular:     (+) -Peripheral Vascular Disease (common iliac)----Previous cardiac testing   Echo: Date: Results:    Stress Test: Date: Results:    ECG Reviewed: Date: 2021 Results:  NSR non-specific ST T changes  Cath: Date: Results:      METS/Exercise Tolerance: >4 METS    Hematologic:       Musculoskeletal:       GI/Hepatic:     (+) GERD,  (-) hiatal hernia   Renal/Genitourinary:     (+) BPH,  (-) renal disease   Endo:     (+) type II DM, Last HgA1c: 10.2, Not using insulin, - not using insulin pump. not previously admitted for DM/DKA.  (-) thyroid disease, chronic steroid usage and obesity   Psychiatric/Substance Use:    (-) psychiatric history   Infectious Disease:       Malignancy:       Other:            Physical Exam    Airway        Mallampati: I   TM distance: > 3 FB   Neck ROM: full   Mouth opening: > 3  cm    Respiratory Devices and Support         Dental  no notable dental history         Cardiovascular   cardiovascular exam normal       Rhythm and rate: regular and normal     Pulmonary   pulmonary exam normal        breath sounds clear to auscultation           OUTSIDE LABS:  HGB 14.3 plts 185 Cr 0.9 GFR 83 K 4.1 glu 317 A1C 10.2  CBC: No results found for: WBC, HGB, HCT, PLT  BMP: No results found for: NA, POTASSIUM, CHLORIDE, CO2, BUN, CR, GLC  COAGS: No results found for: PTT, INR, FIBR  POC:   Lab Results   Component Value Date     (H) 06/02/2021     HEPATIC: No results found for: ALBUMIN, PROTTOTAL, ALT, AST, GGT, ALKPHOS, BILITOTAL, BILIDIRECT, MARCY  OTHER: No results found for: PH, LACT, A1C, EDDIE, PHOS, MAG, LIPASE, AMYLASE, TSH, T4, T3, CRP, SED    Anesthesia Plan    ASA Status:  3      Anesthesia Type: General.     - Airway: ETT   Induction: Intravenous, Propofol.   Maintenance: Balanced.        Consents    Anesthesia Plan(s) and associated risks, benefits, and realistic alternatives discussed. Questions answered and patient/representative(s) expressed understanding.     - Discussed with:  Patient      - Extended Intubation/Ventilatory Support Discussed: No.      - Patient is DNR/DNI Status: No    Use of blood products discussed: No .     Postoperative Care    Pain management: Oral pain medications, IV analgesics, Multi-modal analgesia.        Comments:                Nettie Lacey MD

## 2021-06-02 NOTE — PROGRESS NOTES
PACU to Inpatient Nursing Handoff    Patient Carlos Ricks is a 75 year old male who speaks English.   Procedure Procedure(s):  Holmium Laser Enucleation of the Prostate   Surgeon(s) Primary: Florentin Carbajal MD  Resident - Assisting: Odette Paris MD     Allergies   Allergen Reactions     Seasonal Allergies Other (See Comments)     Other reaction(s): Rhinitis, Nasal discharge, Itching of eye       Isolation  No active isolations     Past Medical History   has a past medical history of BPH (benign prostatic hyperplasia), Diabetes (H), Gastroesophageal reflux disease, and Sleep apnea.    Anesthesia Choice   Dermatome Level     Preop Meds Not applicable   Nerve block Not applicable   Intraop Meds dexamethasone (Decadron)  fentanyl (Sublimaze): 150 mcg total  ondansetron (Zofran): last given at 0935  phenylephrine   Local Meds No   Antibiotics ampicillin (Omnipen) - last given at 0800  gentamicin (Garamycin) - last given at 0803     Pain Patient Currently in Pain: denies   PACU meds  acetaminophen (Tylenol): 650 mg (total dose) last given at 1100   oxycodone (Roxicodone): 5 mg (total dose) last given at 1100    PCA / epidural No   Capnography     Telemetry ECG Rhythm: Sinus rhythm   Inpatient Telemetry Monitor Ordered? No        Labs Glucose No results found for: GLC    Hgb No results found for: HGB    INR No results found for: INR   PACU Imaging Not applicable     Wound/Incision     CMS        Equipment Not applicable   Other LDA       IV Access Peripheral IV 06/02/21 Distal;Left;Posterior Lower forearm (Active)   Site Assessment WDL 06/02/21 1022   Line Status Infusing 06/02/21 1022   Dressing Intervention New dressing  06/02/21 0631   Infiltration Scale 0 06/02/21 1022   Infiltration Site Treatment Method  None 06/02/21 0631   Number of days: 0      Blood Products Not applicable  mL   Intake/Output Date 06/02/21 0700 - 06/03/21 0659   Shift 7382-7123 5171-7557 2606-6107 24 Hour Total   INTAKE   I.V.  1100   1100   Shift Total(mL/kg) 1100(14.19)   1100(14.19)   OUTPUT   Urine 250   250   Shift Total(mL/kg) 250(3.23)   250(3.23)   Weight (kg) 77.5 77.5 77.5 77.5      Drains / Llanes Urethral Catheter Triple-lumen 22 fr (Active)   Tube Description Positional 06/02/21 1022   Collection Container Standard 06/02/21 1022   Securement Method Securing device (Describe) 06/02/21 1022   Rationale for Continued Use Other (Comment) 06/02/21 1022   Number of days: 0      Time of void PreOp Void Prior to Procedure: 0430 (06/02/21 0604)    PostOp      Diapered? No   Bladder Scan     PO    crackers and water     Vitals    B/P: 115/53  T: 99  F (37.2  C)    Temp src: Axillary  P:  Pulse: 80 (06/02/21 1030)          R: 17  O2:  SpO2: 96 %    O2 Device: None (Room air) (06/02/21 1030)    Oxygen Delivery: 6 LPM (06/02/21 1013)         Family/support present significant other   Patient belongings     Patient transported on cart   DC meds/scripts (obs/outpt) Not applicable   Inpatient Pain Meds Released? Yes       Special needs/considerations None   Tasks needing completion None       Silva Mcintyre RN  ASCOM [id number]

## 2021-06-02 NOTE — ANESTHESIA PROCEDURE NOTES
Airway       Patient location during procedure: OR  Staff -        Anesthesiologist:  Nettie Lacey MD       CRNA: Keira Knapp APRN CRNA       Performed By: anesthesiologist  Consent for Airway        Urgency: elective  Indications and Patient Condition       Indications for airway management: gabriel-procedural       Induction type:intravenous       Mask difficulty assessment: 1 - vent by mask    Final Airway Details       Final airway type: endotracheal airway       Successful airway: ETT - single and Oral  Endotracheal Airway Details        ETT size (mm): 7.5       Successful intubation technique: direct laryngoscopy       DL Blade Type: MAC 3       Grade View of Cords: 2       Adjucts: bougie       Position: Right       Measured from: gums/teeth       Secured at (cm): 20       Bite block used: None    Post intubation assessment        Placement verified by: capnometry, equal breath sounds and chest rise        Number of attempts at approach: 4 or more       Number of other approaches attempted: 1       Secured with: silk tape       Ease of procedure: difficult       Dentition: Intact and Unchanged    Medication(s) Administered   Medication Administration Time: 6/2/2021 7:49 AM    Additional Comments       CRNA DL with Lazaro 2, grade II view obtained, unable to pass tube; head repositioned DL with Lazaro 2, unable to pass tube; head repositioned MDA MAC 2, grade II view obtained, unable to pass tube; MDA added bougie and tube was easily passed; patient ventilated throughout with stable vital signs and no desaturations

## 2021-06-02 NOTE — H&P
Chief Complaint:   Urinary Retention         History of Present Illness:    Carlos Ricks is a very pleasant 75 year old male who presents with a history of urinary retention.  He has a large bladder diverticulum and BPH.  He has previously been counseled regarding treatemtn options and has elected to proceed with HoLEP.         Past Medical History:     Past Medical History:   Diagnosis Date     BPH (benign prostatic hyperplasia)      Diabetes (H)      Gastroesophageal reflux disease      Sleep apnea     uses CPAP            Past Surgical History:   History reviewed. No pertinent surgical history.         Medications     Current Facility-Administered Medications   Medication     ampicillin (OMNIPEN) 2 g vial to attach to  mL bag     gentamicin (GARAMYCIN) 300 mg in sodium chloride 0.9 % 50 mL intermittent infusion     Provider ordered ALTERNATE pre op antibiotic.   Macrobid  Tamsulosin         Family History:   History reviewed. No pertinent family history.         Social History:     Social History     Socioeconomic History     Marital status: Single     Spouse name: Not on file     Number of children: Not on file     Years of education: Not on file     Highest education level: Not on file   Occupational History     Not on file   Social Needs     Financial resource strain: Not on file     Food insecurity     Worry: Not on file     Inability: Not on file     Transportation needs     Medical: Not on file     Non-medical: Not on file   Tobacco Use     Smoking status: Former Smoker     Quit date: 1990     Years since quittin.3     Smokeless tobacco: Never Used   Substance and Sexual Activity     Alcohol use: Not Currently     Drug use: Never     Sexual activity: Not on file   Lifestyle     Physical activity     Days per week: Not on file     Minutes per session: Not on file     Stress: Not on file   Relationships     Social connections     Talks on phone: Not on file     Gets together: Not  "on file     Attends Christianity service: Not on file     Active member of club or organization: Not on file     Attends meetings of clubs or organizations: Not on file     Relationship status: Not on file     Intimate partner violence     Fear of current or ex partner: Not on file     Emotionally abused: Not on file     Physically abused: Not on file     Forced sexual activity: Not on file   Other Topics Concern     Not on file   Social History Narrative     Not on file            Allergies:   Seasonal allergies         Review of Systems:  From intake questionnaire   Negative 14 system review except as noted on HPI, nurse's note.         Physical Exam:   Patient is a 75 year old  male   Vitals: Blood pressure 135/80, pulse 77, temperature 98.9  F (37.2  C), temperature source Oral, resp. rate 18, height 1.803 m (5' 11\"), weight 77.5 kg (170 lb 13.7 oz), SpO2 98 %.  General Appearance Adult: Alert, no acute distress, oriented  HENT: throat/mouth:normal, good dentition  Neck: No adenopathy,masses or thyromegaly  Lungs: no respiratory distress, or pursed lip breathing  Heart: No obvious jugular venous distension present  Abdomen: soft, nontender, no organomegaly or masses, Body mass index is 23.83 kg/m .  Lymphatics: No cervical or supraclavicular adenopathy  Musculoskeltal: extremities normal, no peripheral edema  Skin: no suspicious lesions or rashes  Neuro: Alert, oriented, speech and mentation normal  Psych: affect and mood normal  Gait: Normal    UA RESULTS:   Recent Labs   Lab Test 05/13/21  1431   SG 1.015   URINEPH 5.5   NITRITE Negative   RBCU 25-50*   WBCU *                Assessment and Plan:     Assessment:75 year old male with hx of DM here for HoLEP    Plan:  After discussion of medical and surgical treatments for BPH including alpha blockers, anticholinergics, transurethral resection, laser ablation, enucleation and simple prostatectomy, Mr. Ricks has decided to proceed with Holmium Laser Enucleation " of the Prostate (HoLEP).    We discussed the associated risks of this procedure included but not limited to the following:  -Bleeding, potentially significant enough to require clot evacuation and blood transfusion  -Infection, for which we will plan to treat preoperatively based on targeted antibiotic therapy  -Damage to the bladder, urethra and penis including the risk of urethral stricture and bladder neck contracture  -Risk of incidentally discovered prostate cancer.  We discussed that this would not preclude him from further therapy though it could prolong recovery and potentially increase risk of complications associated with cancer treatment.  Further preoperative workup to assess for prostate cancer prior to surgery was offered but patient deferred.  -Risk of retrograde ejaculation which would be expected to occur in the majority if not all men after HoLEP  -Risk of urinary incontinence.  We discussed that in the majority of men this is a transient process that is generally self limited to the first 6-12 weeks after surgery though could take longer to resolve depending on baseline bladder instability.  -Risk of postperative urinary retention though in published series HoLEP has been found to be associated with high success rates of achieving spontaneous voiding even in men with underactive and atonic bladders.        IFlorentin saw and evaluated this patient and agree with the plan as stated above.  I personally performed all listed procedures.     CC:  Suyapa Gerber

## 2021-06-02 NOTE — OP NOTE
Operative Report  6/2/2021    PREOPERATIVE DIAGNOSIS:  Prostatic hypertrophy with urinary retention  POSTOPERATIVE DIAGNOSIS: Same as above    PROCEDURE PERFORMED: Holmium laser enucleation of the prostate  ATTENDING SURGEON: Florentin Carbajal MD  RESIDENT SURGEON: Odette Paris MD    FINDINGS: Approximately 50 gm prostate with trilobar hypertrophy. Bladder with moderate trabeculation  ANESTHESIA: General  INTRAVENOUS FLUIDS: See anesthesia records  ESTIMATED BLOOD LOSS: 200 ml   SPECIMENS: Prostate adenoma  DRAINS: 22-Slovenian 3-way catheter with 60 ml in balloon     INDICATIONS FOR PROCEDURE: Carlos Ricks is a(n) 75 year old male who was seen in consultation for urinary retention. He has elected treatment with laser enucleation. Prostate volume estimated to be 50+ grams. His digital rectal exam was unremarkable.  After discussion of the risks, benefits and alternatives of the procedure, the patient agreed to proceed with the above stated procdure.    DESCRIPTION OF PROCEDURE: After obtaining informed consent, the patient was taken to the operating room and placed under general anesthesia.  He was repositioned in dorsal lithotomy making sure that the legs were positioned and padded safely.  He was then prepped and draped in standard sterile fashion.  Culture directed antibiotics were administered and bilateral sequential compression devices were placed.  A time out was performed confirming the appropriate patient identity and planned procedure.     The procedure was begun by generously lubricating the urethra.  The urethra was noted to be patent and did not require use of the lisbeth urethrotome in order to place the 26F outer sheath.  The outer sheath was placed over a deflecting obturator and we then looked the scope through the posterior urethra and into the bladder.  The prostate was noted to have a trilobar configuration.  At this point we inserted the 550 micron holmium laser through the 7F laser catheter.    We  began enucleation by making an apical incision adjacent to the veromontanum.  We developed the apical plane on the left side and carried this laterally until 3 oclock.  We then proceeded to take the median lobe by making paired incisions at 5 and 7 oclock and attaching this to the apex in a V shape.  We pushed the lobe up and amputated the bladder neck attachment.     We next dissected the remaining lateral lobes of the prostate out in a circumferential fashion along the lateral lobes, coming over the top of the gland and entering the bladder neck.  We next identified the mucosal strip anteriorly and transected it with the laser.  We then proceeded to take down the remaining lateral and posterior attachments which allowed us to push the adenoma in an en-bloc fashion into the bladder. Total enucleation time was 65 minutes.    At this point there was a moderate amount of bleeding and approximately 10 minutes were spent identifying bleeding vessels in the fossa and coagulating them with the laser.  Once hemostasis was adequate we switched from the laser resectoscope to the 26F offset telescope with the Piranha morcellation device.  We added an extra inflow to distend the bladder.  The blades were adjusted and set at a rate of 1500 RPM.  We proceeded with morcellation making sure that we morcellated the entirety of the adenoma.  Total morcellation time was 6 minutes.     We then switched back to the laser resectoscope one final time to ensure that no residual tissue was left in the bladder.  We also confirmed that the bladder was unharmed from morcellation and that both ureteral orifices were unharmed during the procedure.  We inspected the fossa and obtained final hemostasis.   We looked the scope out noting that the sphincter was completely intact without evidence of thermal or mechanical injury.     We lubricated the urethra again and passed a 22F 3-way erickson catheter over a catheter guide.  The urine was noted to be  clear.  We filled the balloon with 60 ml of sterile water and DID place the catheter on traction.  The patient was woken from anesthesia and taken to the recovery room in stable condition.     The specimen was weighed and found to be approximately 32 g.     POSTOPERATIVE PLAN:   -We will monitor the patient post operatively on continuous bladder irrigation for signs of ongling bleeding.  If clear we will consider discharge with erickson removal as an outpatient.  If continues to have ongoing bleeding concerning for clot formation without bladder irrigation will plan to admit for observation    Florentin Carbajal

## 2021-06-02 NOTE — PLAN OF CARE
Pt. admitted from PACU at 1140. Pt. accompanied by transport and arrived with personal belongings. Report was taken from BELLE Ascencio in PACU. Pt. is A&Ox4. CAPNO is on and activated. VSS. 02 sats=94% on RA. Lung sounds= clear bilaterally with both anterior and posterior. Bowel sounds are active in all 4 quadrants. Last BM 6/1 per pt report. CMS and Neuro's are intact. Denies numbness and tingling in all extremities. Pt has negligible pain at this time and declined pain interventions. Pt. denied nausea, CP, SOB, lightheadedness, and dizziness. Is on a regular diet and appetite was good. Dressing to penis is intact. Llanes Catheter is patent and had pink/clear colored urine output, pt is on CBI. PIV is patent and infusing in left arm. PCD's are in place to BLE's. Pt. educated on use and purpose of the Incentive Spirometer. Pt. is oriented to the room and call light system and the call light is within reach. Continue to monitor.

## 2021-06-02 NOTE — BRIEF OP NOTE
[facility]    Brief Operative Note    Pre-operative diagnosis: Benign prostatic hyperplasia with urinary retention [N40.1, R33.8]  Post-operative diagnosis Same as pre-operative diagnosis    Procedure: Procedure(s):  Holmium Laser Enucleation of the Prostate  Surgeon: Surgeon(s) and Role:     * Florentin Carbajal MD - Primary     * Odette Paris MD - Resident - Assisting  Anesthesia: Choice   Estimated blood loss: 100 mL  Drains:  22 Fr three-way catheter with 60 mL in the balloon - on CBI and traction.  Specimens:   ID Type Source Tests Collected by Time Destination   A : Prostate Tissue Prostate SURGICAL PATHOLOGY EXAM Florentin Carbajal MD 6/2/2021  7:13 AM      Findings:   32 g of tissue morcellated. Unremarkable HoLEP.  Complications: None.  Implants: * No implants in log *  Dispo: PACU then floor. Irrigation and TOV in the AM.     Odette Paris MD  PGY-3 Urology  Pager [phone]

## 2021-06-02 NOTE — ANESTHESIA POSTPROCEDURE EVALUATION
"Patient: Carlos Ricks    Procedure(s):  Holmium Laser Enucleation of the Prostate    Diagnosis:Benign prostatic hyperplasia with urinary retention [N40.1, R33.8]  Diagnosis Additional Information: No value filed.    Anesthesia Type:  General    Note:  Disposition: Outpatient   Postop Pain Control: Uneventful            Sign Out: Well controlled pain   PONV: No   Neuro/Psych: Uneventful            Sign Out: Acceptable/Baseline neuro status   Airway/Respiratory: Uneventful            Sign Out: Acceptable/Baseline resp. status   CV/Hemodynamics: Uneventful            Sign Out: Acceptable CV status; No obvious hypovolemia; No obvious fluid overload   Other NRE: NONE   DID A NON-ROUTINE EVENT OCCUR? No         Patient Vitals for the past 24 hrs:   BP Temp Temp src Pulse Resp SpO2 Height Weight   06/02/21 1100 139/84 -- -- 81 17 96 % -- --   06/02/21 1045 98/63 -- -- 81 19 95 % -- --   06/02/21 1030 115/53 -- -- 80 17 96 % -- --   06/02/21 1015 114/67 -- -- 77 15 99 % -- --   06/02/21 1013 130/65 37.2  C (99  F) Axillary 77 -- 99 % -- --   06/02/21 0542 135/80 37.2  C (98.9  F) Oral 77 18 98 % 1.803 m (5' 11\") 77.5 kg (170 lb 13.7 oz)       Last vitals:  Vitals:    06/02/21 1030 06/02/21 1045 06/02/21 1100   BP: 115/53 98/63 139/84   Pulse: 80 81 81   Resp: 17 19 17   Temp:      SpO2: 96% 95% 96%       Last vitals prior to Anesthesia Care Transfer:  CRNA VITALS  6/2/2021 0940 - 6/2/2021 1040      6/2/2021             Pulse:  81    SpO2:  99 %    Resp Rate (observed):  (!) 4          Electronically Signed By: Nettie Lacey MD  June 2, 2021  11:11 AM  "

## 2021-06-02 NOTE — ANESTHESIA CARE TRANSFER NOTE
Patient: Carlos Ricks    Procedure(s):  Holmium Laser Enucleation of the Prostate    Diagnosis: Benign prostatic hyperplasia with urinary retention [N40.1, R33.8]  Diagnosis Additional Information: No value filed.    Anesthesia Type:   General     Note:    Oropharynx: oropharynx clear of all foreign objects and spontaneously breathing  Level of Consciousness: drowsy  Oxygen Supplementation: face mask  Level of Supplemental Oxygen (L/min / FiO2): 6  Independent Airway: airway patency satisfactory and stable  Dentition: dentition unchanged  Vital Signs Stable: post-procedure vital signs reviewed and stable  Report to RN Given: handoff report given  Patient transferred to: PACU    Handoff Report: Identifed the Patient, Identified the Reponsible Provider, Reviewed the pertinent medical history, Discussed the surgical course, Reviewed Intra-OP anesthesia mangement and issues during anesthesia, Set expectations for post-procedure period and Allowed opportunity for questions and acknowledgement of understanding      Vitals: (Last set prior to Anesthesia Care Transfer)  CRNA VITALS  6/2/2021 0940 - 6/2/2021 1017      6/2/2021             Pulse:  81    SpO2:  99 %    Resp Rate (observed):  (!) 4        Electronically Signed By: BUCKY CARPIO APRN CRNA  June 2, 2021  10:17 AM

## 2021-06-02 NOTE — PHARMACY-ADMISSION MEDICATION HISTORY
Admission Medication History Completed by Pharmacy    See [facility] Admission Navigator for allergy information, preferred outpatient pharmacy, prior to admission medications and immunization status.     Medication History Sources:     Patient     [facility]where    Changes made to PTA medication list (reason):    Added: None    Deleted: lidocaine for urethral use (pt says he uses a lubricating jelly now)    Changed: Metformin formulation & when patient takes it (per [facility]/pt), time of day patient takes the Flomax    Additional Information:    Patient completed the Macrobid therapy yesterday evening. He also said he will most likely no longer need the tamsulosin after surgery.    Prior to Admission medications    Medication Sig Last Dose Taking? Auth Provider   aspirin 81 MG EC tablet Take 81 mg by mouth daily 5/26/2021 Yes Reported, Patient   glipiZIDE (GLUCOTROL) 5 MG tablet Take 5 mg by mouth every morning  6/1/2021 at 1100 Yes Reported, Patient   metFORMIN (GLUCOPHAGE-XR) 500 MG 24 hr tablet Take 2,000 mg by mouth At Bedtime  6/1/2021 at HS Yes Unknown, Entered By History   Multiple Vitamin (MULTI-VITAMIN DAILY PO) Take 1 tablet by mouth daily  5/26/2021 Yes Reported, Patient   nitroFURantoin macrocrystal-monohydrate (MACROBID) 100 MG capsule Take 1 capsule (100 mg) by mouth 2 times daily for 7 days 6/1/2021 at 1800 Yes Florentin Carbajal MD   omeprazole (PRILOSEC) 20 MG DR capsule TAKE TWO CAPSULES BY MOUTH EVERY DAY ON AN EMPTY STOMACH, AT LEAST 30 MINUTES PRIOR TO A MEAL FOR GERD 6/2/2021 at 0400 Yes Reported, Patient   tamsulosin (FLOMAX) 0.4 MG capsule Take 0.4 mg by mouth every evening  6/1/2021 at 1730 Yes Reported, Patient       Date completed: 06/02/21    Medication history completed by: Laurie Palma, PharmD, BCPS

## 2021-06-03 VITALS
BODY MASS INDEX: 23.92 KG/M2 | TEMPERATURE: 98.5 F | SYSTOLIC BLOOD PRESSURE: 120 MMHG | HEIGHT: 71 IN | WEIGHT: 170.86 LBS | HEART RATE: 67 BPM | RESPIRATION RATE: 23 BRPM | DIASTOLIC BLOOD PRESSURE: 65 MMHG | OXYGEN SATURATION: 93 %

## 2021-06-03 LAB
ANION GAP SERPL CALCULATED.3IONS-SCNC: 5 MMOL/L (ref 3–14)
BUN SERPL-MCNC: 11 MG/DL (ref 7–30)
CALCIUM SERPL-MCNC: 8 MG/DL (ref 8.5–10.1)
CHLORIDE SERPL-SCNC: 111 MMOL/L (ref 94–109)
CO2 SERPL-SCNC: 26 MMOL/L (ref 20–32)
CREAT SERPL-MCNC: 0.75 MG/DL (ref 0.66–1.25)
ERYTHROCYTE [DISTWIDTH] IN BLOOD BY AUTOMATED COUNT: 13 % (ref 10–15)
GFR SERPL CREATININE-BSD FRML MDRD: >90 ML/MIN/{1.73_M2}
GLUCOSE SERPL-MCNC: 99 MG/DL (ref 70–99)
HCT VFR BLD AUTO: 35.6 % (ref 40–53)
HGB BLD-MCNC: 11.5 G/DL (ref 13.3–17.7)
MCH RBC QN AUTO: 29.7 PG (ref 26.5–33)
MCHC RBC AUTO-ENTMCNC: 32.3 G/DL (ref 31.5–36.5)
MCV RBC AUTO: 92 FL (ref 78–100)
PLATELET # BLD AUTO: 182 10E9/L (ref 150–450)
POTASSIUM SERPL-SCNC: 3.6 MMOL/L (ref 3.4–5.3)
RBC # BLD AUTO: 3.87 10E12/L (ref 4.4–5.9)
SODIUM SERPL-SCNC: 142 MMOL/L (ref 133–144)
WBC # BLD AUTO: 6.3 10E9/L (ref 4–11)

## 2021-06-03 PROCEDURE — 258N000003 HC RX IP 258 OP 636

## 2021-06-03 PROCEDURE — 85027 COMPLETE CBC AUTOMATED: CPT | Performed by: STUDENT IN AN ORGANIZED HEALTH CARE EDUCATION/TRAINING PROGRAM

## 2021-06-03 PROCEDURE — 80048 BASIC METABOLIC PNL TOTAL CA: CPT | Performed by: STUDENT IN AN ORGANIZED HEALTH CARE EDUCATION/TRAINING PROGRAM

## 2021-06-03 PROCEDURE — 258N000003 HC RX IP 258 OP 636: Performed by: STUDENT IN AN ORGANIZED HEALTH CARE EDUCATION/TRAINING PROGRAM

## 2021-06-03 PROCEDURE — 36415 COLL VENOUS BLD VENIPUNCTURE: CPT | Performed by: STUDENT IN AN ORGANIZED HEALTH CARE EDUCATION/TRAINING PROGRAM

## 2021-06-03 PROCEDURE — 250N000013 HC RX MED GY IP 250 OP 250 PS 637: Performed by: STUDENT IN AN ORGANIZED HEALTH CARE EDUCATION/TRAINING PROGRAM

## 2021-06-03 PROCEDURE — 250N000011 HC RX IP 250 OP 636: Performed by: STUDENT IN AN ORGANIZED HEALTH CARE EDUCATION/TRAINING PROGRAM

## 2021-06-03 RX ORDER — NITROFURANTOIN 25; 75 MG/1; MG/1
100 CAPSULE ORAL 2 TIMES DAILY
Qty: 6 CAPSULE | Refills: 0 | Status: SHIPPED | OUTPATIENT
Start: 2021-06-03

## 2021-06-03 RX ORDER — SODIUM CHLORIDE 9 MG/ML
INJECTION, SOLUTION INTRAVENOUS
Status: COMPLETED
Start: 2021-06-03 | End: 2021-06-03

## 2021-06-03 RX ADMIN — AMPICILLIN 2 G: 2 INJECTION, POWDER, FOR SOLUTION INTRAVENOUS at 08:27

## 2021-06-03 RX ADMIN — ACETAMINOPHEN 650 MG: 325 TABLET, FILM COATED ORAL at 04:58

## 2021-06-03 RX ADMIN — SODIUM CHLORIDE 1000 ML: 9 INJECTION, SOLUTION INTRAVENOUS at 01:21

## 2021-06-03 RX ADMIN — OMEPRAZOLE 40 MG: 20 CAPSULE, DELAYED RELEASE ORAL at 08:27

## 2021-06-03 RX ADMIN — GENTAMICIN SULFATE 300 MG: 40 INJECTION, SOLUTION INTRAMUSCULAR; INTRAVENOUS at 06:17

## 2021-06-03 RX ADMIN — AMPICILLIN 2 G: 2 INJECTION, POWDER, FOR SOLUTION INTRAVENOUS at 02:23

## 2021-06-03 NOTE — PLAN OF CARE
"      VS: /59 (BP Location: Left arm)   Pulse 64   Temp 98.6  F (37  C) (Oral)   Resp 17   Ht 1.803 m (5' 11\")   Wt 77.5 kg (170 lb 13.7 oz)   SpO2 93%   BMI 23.83 kg/m      Output: CBI patent with adequate output. Running pink. Slowed CBI.Last BM 6/2.   Lungs: Clear, diminished in bases.   Activity: Stand by assist. Reposition independently.   Skin: Intact   Pain:   Denies any pain. Given scheduled Tylenol.,   Neuro/CMS:   A&Ox4, denies N/T.   Dressing(s):   Around penis CDI   Diet:   Regular diet   LDA:   PIV Left forearm - Infusing. CBI   Plan:   Continue with plan of care. Pt needs to meet 3 goals before discharge which are: Ambulate as they were before admission - YES, Tolerate regular diet - YES, Urinate without erickson - NO.   Additional Info:         "

## 2021-06-03 NOTE — PLAN OF CARE
"Vitals: /65 (BP Location: Right arm)   Pulse 67   Temp 98.5  F (36.9  C) (Oral)   Resp 23   Ht 1.803 m (5' 11\")   Wt 77.5 kg (170 lb 13.7 oz)   SpO2 93%   BMI 23.83 kg/m  . Vital signs stable. Pt on room air. A/Ox4, independent.    Output: spontaneously voiding without problem. Pt encouraged to double void to reduce PVRs. Last BM was 6/1/2021. No concerns    Pt. discharged at 11am to home, was pick-up by his wife, and left with personal belongings. Pt. received complete discharge paperwork. Pt. was given times of last dose for all discharge medications in writing on discharge medication sheets. Discharge teaching included pain management, activity restrictions, and signs and symptoms of infection. Pt. to follow up with Urologist on July 12th via telehealth. Pt. had no further questions at the time of discharge and no unmet needs were identified.   "

## 2021-06-03 NOTE — PLAN OF CARE
Outpatient discharge goals :     Outpatient in a Bed discharge goals PRIOR TO DISCHARGE     Comments: List all Outpatient in a Bed discharge goals to be met before discharge home:   ~ Patient able to ambulate as they were prior to admission or with assist devices provided by therapies during their stay. ----Yes  ~ Tolerate a regular diet ---------Yes  ~ Urinate without a erickson catheter -----NO, CBI going  ~ Nurse to Notify Provider when Outpatient in a Bed discharge goals have been met and patient is ready for discharge.------No,  has not been met yet        Outpatient observation brochure given to pt.  Reviewed discharge goals with pt.   Monitored pt with discharge goals every 4 hrs

## 2021-06-03 NOTE — PROGRESS NOTES
"Urology  Progress Note  06/03/2021    - No acute events overnight  - Patient has ambulated the halls  - Tolerating a diet  - No pain  - Urine is clear/yellow on a very slow drip CBI    Exam  /65 (BP Location: Right arm)   Pulse 67   Temp 98.5  F (36.9  C) (Oral)   Resp 23   Ht 1.803 m (5' 11\")   Wt 77.5 kg (170 lb 13.7 oz)   SpO2 93%   BMI 23.83 kg/m    No acute distress  Unlabored breathing  Abdomen soft, nontender, nondistended.  Lalnes with clear/yellow urine in tubing    I/O's (last 24/since midnight):  UOP: CBI    Labs  WBC 6.3  Hgb 11.5  Cr 0.75    Assessment/Plan  75 year old y/o male POD#1 s/p HoLEP, doing well.     Neuro: Tylenol, prn oxy for pain control  CV: HDS, no issues  Pulm: incentive spirometry while awake  FEN/GI: Regular diet, MIVF @ 100/hr  Endo: No issues  : Bladder irrigated and catheter removed today. Awaiting voids x2 prior to discharge.   Heme/ID: No issues. Continue Amp/Gent while in the hospital. Will discharge with 3 days Macrobid prophylaxis.   Activity: Ad loulou  PPx: SCDs  Dispo: Home today after voids.     Seen and examined with the chief resident. Will discuss with Dr. Carbajal.    Odette Paris MD  PGY-3 Urology  Pager [phone]     Contacting the Urology Team     Please use the following job codes to reach the Urology Team. Note that you must use an in house phone and that job codes cannot receive text pages.     On weekdays, dial [phone] (or star-star-star [phone] on the new [employer] telephones) then [phone] to reach the Adult Urology resident or PA on call    On weekdays, dial [phone] (or star-star-star [phone] on the new [employer] telephones) then [phone] to reach the Pediatric Urology resident    On weeknights and weekends, dial [phone] (or star-star-star [phone] on the new [employer] telephones) then [phone] to reach the Urology resident on call (for both Adult and Pediatrics)              "

## 2021-06-03 NOTE — PLAN OF CARE
" Outpatient in a Bed discharge goals PRIOR TO DISCHARGE     Comments: List all Outpatient in a Bed discharge goals to be met before discharge home:   ~ Patient able to ambulate as they were prior to admission or with assist devices provided by therapies during their stay. --Yes  ~ Tolerate a regular diet --yes  ~ Urinate without a erickson catheter -- No, TOV tomorrow  ~ Nurse to Notify Provider when Outpatient in a Bed discharge goals have been met and patient is ready for discharge.- No, tomorrow      /59 (BP Location: Left arm)   Pulse 64   Temp 98.6  F (37  C) (Oral)   Resp 17   Ht 1.803 m (5' 11\")   Wt 77.5 kg (170 lb 13.7 oz)   SpO2 93%   BMI 23.83 kg/m   on RA.  Alert and oriented x4.  Denies any pain or bladder spasms.     CBI running slowly, urine clear pink,  Passed some skin tissue with small blood clots.   Up with SBA and walker.  Ambulated hallway w/o dizziness/LH.  No n/v and good appetite. Tolerates regular diet well.  Will discharge home tomorrow.       "

## 2021-06-04 LAB — COPATH REPORT: NORMAL

## 2021-06-20 ENCOUNTER — TELEPHONE (OUTPATIENT)
Dept: SURGERY | Facility: CLINIC | Age: 76
End: 2021-06-20

## 2021-06-21 ENCOUNTER — TELEPHONE (OUTPATIENT)
Dept: UROLOGY | Facility: CLINIC | Age: 76
End: 2021-06-21

## 2021-06-21 NOTE — TELEPHONE ENCOUNTER
Called patient and he had holep 6/2  He has kept a log of color and amts of urine each day --- on and off clear davi red urine but yesterday late he saw dark red blood and clots. He voided the clot out and this color with clots continued  He spoke to resident.  He has no fever no pain no blood thinners no increased activity.  Concerned about the amount of clots coming out.  He had to for the first time last night cath himself he got 500cc and then again much later 600 ml with voiding on his own once between cathing.  He has increased his fluids and voided this morning and same dark red blood with small clots Lyric Bolton LPN Staff Nurse

## 2021-06-21 NOTE — TELEPHONE ENCOUNTER
[facility] Call Center    Phone Message    May a detailed message be left on voicemail: yes     Reason for Call: Symptoms or Concerns     If patient has red-flag symptoms, warm transfer to triage line    Current symptom or concern: Dark red blood in urine/passing blood clots.     Symptoms have been present for: Since last night    Has patient previously been seen for this? No    By :     Date:     Are there any new or worsening symptoms? Yes: New      Action Taken: Message routed to:  [facility] Urology    Travel Screening: Not Applicable

## 2021-06-21 NOTE — TELEPHONE ENCOUNTER
I received a page that patient was requesting a call back from the urology provider on call. I called and identified the patient prior to proceeding with the conversation. I discussed with the patient that any advice provided over the phone was limited by the fact that I was unable to perform an exam or additional workup, and that if there were significant concerns the safest thing was to always be seen at an urgent care center or emergency department.    Patient recently underwent HoLEP. He reports baseline clear yellow urine, occasionally with blood. Tonight he passed a small amount of dark red blood several times. He then had to urinate, was unable to, and successfully catheterized himself for 550 mL of darker red urine. There were some clots in the drainage. He feels like he completely emptied his bladder. He denies fevers, chills, chest pain, difficulty breathing, shortness of breath, flank pain, lightheadedness or dizziness.     I discussed with patient that he could continue to monitor his symptoms at home, increase fluid intake, and see if the blood in his urine improves overnight. I discussed strict return precautions including inability to drain his bladder even after passing a catheter as well as signs of infection or low blood levels.     He voiced understanding of these recommendations and thanked me for the call.    Mohit Hernández MD  Urology Resident, PGY-2

## 2021-06-23 NOTE — TELEPHONE ENCOUNTER
[facility] Call Center    Phone Message    May a detailed message be left on voicemail: yes     Reason for Call: Other: Pt said that he missed Lyric's call and would like a call back. Please call at .     Action Taken: Message routed to:  [facility] Urology    Travel Screening: Not Applicable

## 2021-06-23 NOTE — TELEPHONE ENCOUNTER
Patient called and bloody urine and blood clots all gone since Monday afternoon  He is urinating on his own and doing well.  His urine color is yellow to davi feeling much better about this surgery Lyric Bolton LPN Staff Nurse

## 2021-07-01 ENCOUNTER — DOCUMENTATION ONLY (OUTPATIENT)
Dept: OTHER | Facility: CLINIC | Age: 76
End: 2021-07-01

## 2021-07-08 ENCOUNTER — PRE VISIT (OUTPATIENT)
Dept: UROLOGY | Facility: CLINIC | Age: 76
End: 2021-07-08

## 2021-07-08 NOTE — TELEPHONE ENCOUNTER
Reason for visit: post op check     Dx/Hx/Sx: HoLEP    Records/imaging/labs/orders: nurse visit flow/pvr scheduled 7/12    At Rooming: video return

## 2021-07-12 ENCOUNTER — OFFICE VISIT (OUTPATIENT)
Dept: UROLOGY | Facility: CLINIC | Age: 76
End: 2021-07-12
Payer: COMMERCIAL

## 2021-07-12 DIAGNOSIS — N40.1 BENIGN PROSTATIC HYPERPLASIA WITH URINARY RETENTION: Primary | ICD-10-CM

## 2021-07-12 DIAGNOSIS — R33.8 BENIGN PROSTATIC HYPERPLASIA WITH URINARY RETENTION: Primary | ICD-10-CM

## 2021-07-12 PROCEDURE — 99024 POSTOP FOLLOW-UP VISIT: CPT

## 2021-07-12 NOTE — PROGRESS NOTES
Chief Complaint   Patient presents with     Allied Health Visit     Uroflow/PVR       Patient Active Problem List   Diagnosis     Benign prostatic hyperplasia with urinary retention       Allergies   Allergen Reactions     Seasonal Allergies Other (See Comments)     Other reaction(s): Rhinitis, Nasal discharge, Itching of eye       Current Outpatient Medications   Medication Sig Dispense Refill     aspirin 81 MG EC tablet Take 81 mg by mouth daily       glipiZIDE (GLUCOTROL) 5 MG tablet Take 5 mg by mouth every morning        metFORMIN (GLUCOPHAGE-XR) 500 MG 24 hr tablet Take 2,000 mg by mouth At Bedtime        Multiple Vitamin (MULTI-VITAMIN DAILY PO) Take 1 tablet by mouth daily        nitroFURantoin macrocrystal-monohydrate (MACROBID) 100 MG capsule Take 1 capsule (100 mg) by mouth 2 times daily 6 capsule 0     omeprazole (PRILOSEC) 20 MG DR capsule TAKE TWO CAPSULES BY MOUTH EVERY DAY ON AN EMPTY STOMACH, AT LEAST 30 MINUTES PRIOR TO A MEAL FOR GERD         Social History     Tobacco Use     Smoking status: Former Smoker     Quit date: 1990     Years since quittin.4     Smokeless tobacco: Never Used   Substance Use Topics     Alcohol use: Not Currently     Drug use: Never       Carlos Ricks comes into clinic today at the request of Florentin Carbajal for Uroflow/PVR.    Patient diagnosis: BPH with urinary retention    Urinary Flow Rate:  Peak Flow: 12.4 ml/s  Average Flow: 5.0 ml/s  Voided (mL): 169 ml  Residual Volume by Ultrasound: 33 ml  Character of Curve: intermittent    This service provided today was under the direct supervision of Dr. Barker, who was available if needed.    Venkat Price EMT  2021  12:25 PM

## 2021-07-13 ENCOUNTER — VIRTUAL VISIT (OUTPATIENT)
Dept: UROLOGY | Facility: CLINIC | Age: 76
End: 2021-07-13

## 2021-07-13 VITALS — HEIGHT: 71 IN | WEIGHT: 168 LBS | BODY MASS INDEX: 23.52 KG/M2

## 2021-07-13 DIAGNOSIS — N32.81 OVERACTIVE BLADDER: Primary | ICD-10-CM

## 2021-07-13 DIAGNOSIS — N40.0 ENLARGED PROSTATE: ICD-10-CM

## 2021-07-13 PROCEDURE — 99024 POSTOP FOLLOW-UP VISIT: CPT | Performed by: UROLOGY

## 2021-07-13 RX ORDER — OXYBUTYNIN CHLORIDE 5 MG/1
5 TABLET, EXTENDED RELEASE ORAL DAILY
Qty: 30 TABLET | Refills: 3 | Status: SHIPPED | OUTPATIENT
Start: 2021-07-13

## 2021-07-13 ASSESSMENT — MIFFLIN-ST. JEOR: SCORE: 1529.17

## 2021-07-13 ASSESSMENT — PAIN SCALES - GENERAL: PAINLEVEL: NO PAIN (0)

## 2021-07-13 NOTE — LETTER
"7/13/2021       RE: Carlos Ricks  [address]  [place] [address]     Dear Colleague,    Thank you for referring your patient, Carlos Ricks, to the [facility] UROLOGY [facility]IN[facility] [place] at [facility]. Please see a copy of my visit note below.    Carlos is a 75 year old who is being evaluated via a billable video visit.  This was changed to telephone for connectivity reasons.    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  He underwent laser enucleation with me 6 weeks ago.  He notes that he is doing quite well.  His main concern at this point is that he has a fairly substantial amount of urinary frequency.  He does feel like he is voiding with a strong stream and emptying his bladder well.  He was in the office yesterday where a postvoid residual was minimal as per below.    Urinary Flow Rate:  Peak Flow: 12.4 ml/s  Average Flow: 5.0 ml/s  Voided (mL): 169 ml  Residual Volume by Ultrasound: 33 ml  Character of Curve: intermittent    Pathology was 22 g of benign tissue    Assessment: 75-year-old male with history of bladder outlet obstruction and bladder diverticulum  -We will prescribe trial of anticholinergic to see if it helps with frequency  -We will also obtain updated PSA to establish new baseline  -He can follow-up with us in the next 6 to 8 weeks either with myself or [facility], I expect some of his urinary frequency to improve as healing continues.    13 minutes spent on the telephone with patient     The patient has been notified of following:     \"This telephone visit will be conducted via a call between you and your physician/provider. We have found that certain health care needs can be " "provided without the need for a physical exam. This service lets us provide the care you need with a short phone conversation. If a prescription is necessary we can send it directly to your pharmacy. If lab work is needed we can place an order for that and you can then stop by our lab to have the test done at a later time.   If during the course of the call the physician/provider feels a telephone visit is not appropriate, you will not be charged for this service.\"    Again, thank you for allowing me to participate in the care of your patient.      Sincerely,    Florentin Carbajal MD      "

## 2021-07-13 NOTE — PROGRESS NOTES
"Carlos is a 75 year old who is being evaluated via a billable video visit.  This was changed to telephone for connectivity reasons.    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  He underwent laser enucleation with me 6 weeks ago.  He notes that he is doing quite well.  His main concern at this point is that he has a fairly substantial amount of urinary frequency.  He does feel like he is voiding with a strong stream and emptying his bladder well.  He was in the office yesterday where a postvoid residual was minimal as per below.    Urinary Flow Rate:  Peak Flow: 12.4 ml/s  Average Flow: 5.0 ml/s  Voided (mL): 169 ml  Residual Volume by Ultrasound: 33 ml  Character of Curve: intermittent    Pathology was 22 g of benign tissue    Assessment: 75-year-old male with history of bladder outlet obstruction and bladder diverticulum  -We will prescribe trial of anticholinergic to see if it helps with frequency  -We will also obtain updated PSA to establish new baseline  -He can follow-up with us in the next 6 to 8 weeks either with myself or [facility], I expect some of his urinary frequency to improve as healing continues.    13 minutes spent on the telephone with patient     The patient has been notified of following:     \"This telephone visit will be conducted via a call between you and your physician/provider. We have found that certain health care needs can be provided without the need for a physical exam. This service lets us provide the care you need with a short phone conversation. If a prescription is necessary we can send it directly to your pharmacy. If lab work is needed we can place an order for that and you can then stop by our lab to have the test done " "at a later time.   If during the course of the call the physician/provider feels a telephone visit is not appropriate, you will not be charged for this service.\"        "

## 2021-07-13 NOTE — PATIENT INSTRUCTIONS
Can we see Carlos back in 6 or so weeks with PSA prior for symptom check ,thanks (he can see Olamide, has seen her prior)

## 2021-07-15 ENCOUNTER — LAB (OUTPATIENT)
Dept: LAB | Facility: CLINIC | Age: 76
End: 2021-07-15
Attending: NURSE PRACTITIONER
Payer: COMMERCIAL

## 2021-07-15 DIAGNOSIS — N40.0 ENLARGED PROSTATE: ICD-10-CM

## 2021-07-15 DIAGNOSIS — N32.81 OVERACTIVE BLADDER: ICD-10-CM

## 2021-07-15 LAB — PSA SERPL-MCNC: 0.32 UG/L (ref 0–4)

## 2021-07-15 PROCEDURE — 84153 ASSAY OF PSA TOTAL: CPT | Performed by: PATHOLOGY

## 2021-07-15 PROCEDURE — 36415 COLL VENOUS BLD VENIPUNCTURE: CPT | Performed by: PATHOLOGY

## 2021-08-09 ENCOUNTER — PRE VISIT (OUTPATIENT)
Dept: UROLOGY | Facility: CLINIC | Age: 76
End: 2021-08-09

## 2021-08-09 NOTE — TELEPHONE ENCOUNTER
Reason for visit: follow up- Dr. Carbajal patient     Dx/Hx/Sx: BPH with urinary retention     Records/imaging/labs/orders: PSA in epic     At Rooming: telephone visit

## 2021-08-20 ENCOUNTER — VIRTUAL VISIT (OUTPATIENT)
Dept: UROLOGY | Facility: CLINIC | Age: 76
End: 2021-08-20
Payer: COMMERCIAL

## 2021-08-20 DIAGNOSIS — Z98.890 HISTORY OF PROSTATE SURGERY: Primary | ICD-10-CM

## 2021-08-20 PROCEDURE — 99024 POSTOP FOLLOW-UP VISIT: CPT | Mod: 95 | Performed by: NURSE PRACTITIONER

## 2021-08-20 NOTE — LETTER
"8/20/2021       RE: Carlos Ricks  [address]  [place] [address]     Dear Colleague,    Thank you for referring your patient, Carlos Ricks, to the [facility]OLOGY [facility] [place] at [facility]. Please see a copy of my visit note below.    Carlos is a 75 year old who is being evaluated via a billable telephone visit.      What phone number would you like to be contacted at? [phone]  How would you like to obtain your AVS? Mail a copy  Phone call duration: 8 minutes      Carlos Ricks complains of   Chief Complaint   Patient presents with     Follow Up     BPH w/urinary retention        Assessment/Plan:  75 year old male, s/p HoLEP, doing well. Very minor residual leakage, which has improved over time. Frequency no longer a concern. Feels he is voiding well.  -Follow up with me in one year with a repeat PSA beforehand, or sooner if needed.     Olamide Page, CNP  Department of Urology      Subjective:   75 year old male with a history of BPH, now s/p HoLEP with Dr. Carbajal on 6/2/21, who presents today for virtual follow up. He saw Dr. Carbajal for a post-op visit on 7/13, and reported bothersome urinary frequency. Uroflow completed the day prior showing a normal stream and good bladder emptying. He was started on an anticholinergic trial with oxybutynin 5 mg daily. PSA also obtained to establish new baseline and was 0.32.     Today, he states that he did not start the oxybutynin due to the potential side effect of constipation. He did not want to add a symptom to worry about. He feels his voiding symptoms have \"calmed down\" and everything seems to be working well now. He endorses a \"tiny, tiny bit\" of leakage, which is not an issue for him. This has continued to improve over time. He is back to his regular exercise regimen and feels everything is going well. He has no concerns today.       "

## 2021-08-20 NOTE — PATIENT INSTRUCTIONS
UROLOGY CLINIC VISIT PATIENT INSTRUCTIONS    -Follow up with me in one year with a PSA drawn beforehand, or sooner if needed.     If you have any issues, questions or concerns in the meantime, do not hesitate to contact us at [phone] or via [employer].     It was a pleasure meeting with you today.  Thank you for allowing me and my team the privilege of caring for you today.  YOU are the reason we are here, and I truly hope we provided you with the excellent service you deserve.  Please let us know if there is anything else we can do for you so that we can be sure you are leaving completely satisfied with your care experience.    Olamide Page, CNP

## 2021-08-25 ENCOUNTER — TELEPHONE (OUTPATIENT)
Dept: UROLOGY | Facility: CLINIC | Age: 76
End: 2021-08-25

## 2021-08-25 NOTE — TELEPHONE ENCOUNTER
LVM for patient to -Follow up with me in one year with a PSA drawn beforehand, or sooner if needed

## 2021-10-23 ENCOUNTER — HEALTH MAINTENANCE LETTER (OUTPATIENT)
Age: 76
End: 2021-10-23

## 2022-04-09 ENCOUNTER — HEALTH MAINTENANCE LETTER (OUTPATIENT)
Age: 77
End: 2022-04-09

## 2022-10-10 ENCOUNTER — HEALTH MAINTENANCE LETTER (OUTPATIENT)
Age: 77
End: 2022-10-10

## 2023-05-27 ENCOUNTER — HEALTH MAINTENANCE LETTER (OUTPATIENT)
Age: 78
End: 2023-05-27

## 2024-08-03 ENCOUNTER — HEALTH MAINTENANCE LETTER (OUTPATIENT)
Age: 79
End: 2024-08-03

## 2025-02-02 NOTE — PATIENT INSTRUCTIONS
Problem: Chronic Conditions and Co-morbidities  Goal: Patient's chronic conditions and co-morbidity symptoms are monitored and maintained or improved  Outcome: Progressing     Problem: Discharge Planning  Goal: Discharge to home or other facility with appropriate resources  Outcome: Progressing     Problem: Respiratory - Adult  Goal: Achieves optimal ventilation and oxygenation  Outcome: Progressing     Problem: Safety - Adult  Goal: Free from fall injury  Outcome: Progressing     Problem: ABCDS Injury Assessment  Goal: Absence of physical injury  Outcome: Progressing     Problem: Skin/Tissue Integrity  Goal: Skin integrity remains intact  Description: 1.  Monitor for areas of redness and/or skin breakdown  2.  Assess vascular access sites hourly  3.  Every 4-6 hours minimum:  Change oxygen saturation probe site  4.  Every 4-6 hours:  If on nasal continuous positive airway pressure, respiratory therapy assess nares and determine need for appliance change or resting period  Outcome: Progressing     Problem: Pain  Goal: Verbalizes/displays adequate comfort level or baseline comfort level  Outcome: Progressing     Problem: Neurosensory - Adult  Goal: Remains free of injury related to seizures activity  Outcome: Progressing     Problem: Skin/Tissue Integrity - Adult  Goal: Skin integrity remains intact  Description: 1.  Monitor for areas of redness and/or skin breakdown  2.  Assess vascular access sites hourly  3.  Every 4-6 hours minimum:  Change oxygen saturation probe site  4.  Every 4-6 hours:  If on nasal continuous positive airway pressure, respiratory therapy assess nares and determine need for appliance change or resting period  Outcome: Progressing  Goal: Incisions, wounds, or drain sites healing without S/S of infection  Outcome: Progressing     Problem: Musculoskeletal - Adult  Goal: Return mobility to safest level of function  Outcome: Progressing     Problem: Genitourinary - Adult  Goal: Absence of  UROLOGY CLINIC VISIT PATIENT INSTRUCTIONS    -Follow up for a cystoscopy with Dr. Carbajal     If you have any issues, questions or concerns in the meantime, do not hesitate to contact us at [phone] or via [employer].     It was a pleasure meeting with you today.  Thank you for allowing me and my team the privilege of caring for you today.  YOU are the reason we are here, and I truly hope we provided you with the excellent service you deserve.  Please let us know if there is anything else we can do for you so that we can be sure you are leaving completely satisfied with your care experience.    Olamide Page, CNP

## (undated) DEVICE — LINEN TOWEL PACK X5 5464

## (undated) DEVICE — GLOVE PROTEXIS W/NEU-THERA 7.5  2D73TE75

## (undated) DEVICE — Device

## (undated) DEVICE — PAD CHUX UNDERPAD 30X36" P3036C

## (undated) DEVICE — SYR PISTON IRRIGATION 60 ML DYND20325

## (undated) DEVICE — SOL WATER IRRIG 1000ML BOTTLE 2F7114

## (undated) DEVICE — BAG URINARY DRAIN 4000ML LF 153509

## (undated) DEVICE — TUBING SET PIRANHA 41702208

## (undated) DEVICE — TAPE DURAPORE 3" SILK 1538-3

## (undated) DEVICE — SPECIMEN TRAP TISSUE CONTAINER PIRANHA 2208120

## (undated) DEVICE — CATH FOLEY 3WAY 22FR 30ML SIL 73022SI

## (undated) DEVICE — LINEN GOWN X4 5410

## (undated) DEVICE — STRAP KNEE/BODY 31143004

## (undated) DEVICE — FILTER PIRANHA DISP 2228.901

## (undated) DEVICE — TUBING SUCTION MEDI-VAC 1/4"X20' N620A

## (undated) DEVICE — SUCTION WATERBUG FLOOR PUDDLE VAC 9321

## (undated) DEVICE — SOL NACL 0.9% IRRIG 1000ML BOTTLE 2F7124

## (undated) DEVICE — DRSG ABDOMINAL 07 1/2X8" 7197D

## (undated) DEVICE — LASER FIBER HOLMIUM MOSES 550 D/F/L AC-10030120

## (undated) DEVICE — DRAPE MAYO STAND 23X54 8337

## (undated) DEVICE — TUBING SET THERMEDX UROLOGY SGL USE LL0006

## (undated) DEVICE — CATH SECURE 5445-3

## (undated) DEVICE — SUCTION MANIFOLD NEPTUNE 2 SYS 4 PORT 0702-020-000

## (undated) DEVICE — DRSG GAUZE 4X8" NON21842

## (undated) DEVICE — SYR 50ML LL W/O NDL 309653

## (undated) DEVICE — BLADE MORCELLATOR WOLF PIRANHA 4.75X385MM 49700103

## (undated) DEVICE — TUBING IRRIG CYSTO/BLADDER SET 81" LF 2C4040

## (undated) DEVICE — LIGHT HANDLE X2

## (undated) DEVICE — SOL NACL 0.9% IRRIG 3000ML BAG 2B7477

## (undated) DEVICE — CATH LASER URETERAL 7.1FRX40CM G17797  022403-7.1-40

## (undated) RX ORDER — LIDOCAINE HYDROCHLORIDE 20 MG/ML
JELLY TOPICAL
Status: DISPENSED
Start: 2021-04-13

## (undated) RX ORDER — ACETAMINOPHEN 325 MG/1
TABLET ORAL
Status: DISPENSED
Start: 2021-06-02

## (undated) RX ORDER — FENTANYL CITRATE 50 UG/ML
INJECTION, SOLUTION INTRAMUSCULAR; INTRAVENOUS
Status: DISPENSED
Start: 2021-06-02

## (undated) RX ORDER — FENTANYL CITRATE-0.9 % NACL/PF 10 MCG/ML
PLASTIC BAG, INJECTION (ML) INTRAVENOUS
Status: DISPENSED
Start: 2021-06-02

## (undated) RX ORDER — DEXAMETHASONE SODIUM PHOSPHATE 4 MG/ML
INJECTION, SOLUTION INTRA-ARTICULAR; INTRALESIONAL; INTRAMUSCULAR; INTRAVENOUS; SOFT TISSUE
Status: DISPENSED
Start: 2021-06-02

## (undated) RX ORDER — LIDOCAINE HYDROCHLORIDE 20 MG/ML
INJECTION, SOLUTION EPIDURAL; INFILTRATION; INTRACAUDAL; PERINEURAL
Status: DISPENSED
Start: 2021-06-02

## (undated) RX ORDER — EPHEDRINE SULFATE 50 MG/ML
INJECTION, SOLUTION INTRAMUSCULAR; INTRAVENOUS; SUBCUTANEOUS
Status: DISPENSED
Start: 2021-06-02

## (undated) RX ORDER — ONDANSETRON 2 MG/ML
INJECTION INTRAMUSCULAR; INTRAVENOUS
Status: DISPENSED
Start: 2021-06-02

## (undated) RX ORDER — GLYCOPYRROLATE 0.2 MG/ML
INJECTION INTRAMUSCULAR; INTRAVENOUS
Status: DISPENSED
Start: 2021-06-02

## (undated) RX ORDER — OXYCODONE HYDROCHLORIDE 5 MG/1
TABLET ORAL
Status: DISPENSED
Start: 2021-06-02